# Patient Record
Sex: MALE | Race: WHITE | Employment: FULL TIME | URBAN - METROPOLITAN AREA
[De-identification: names, ages, dates, MRNs, and addresses within clinical notes are randomized per-mention and may not be internally consistent; named-entity substitution may affect disease eponyms.]

---

## 2017-02-10 ENCOUNTER — APPOINTMENT (OUTPATIENT)
Dept: CT IMAGING | Age: 42
End: 2017-02-10
Attending: EMERGENCY MEDICINE
Payer: COMMERCIAL

## 2017-02-10 ENCOUNTER — HOSPITAL ENCOUNTER (EMERGENCY)
Age: 42
Discharge: HOME | End: 2017-02-10
Attending: EMERGENCY MEDICINE
Payer: COMMERCIAL

## 2017-02-10 VITALS
DIASTOLIC BLOOD PRESSURE: 70 MMHG | HEART RATE: 84 BPM | HEIGHT: 66 IN | RESPIRATION RATE: 16 BRPM | TEMPERATURE: 98.8 F | OXYGEN SATURATION: 98 % | SYSTOLIC BLOOD PRESSURE: 144 MMHG | WEIGHT: 300 LBS | BODY MASS INDEX: 48.21 KG/M2

## 2017-02-10 DIAGNOSIS — K52.9 ENTEROCOLITIS: Primary | ICD-10-CM

## 2017-02-10 LAB
ALBUMIN SERPL BCP-MCNC: 3.6 G/DL (ref 3.5–5)
ALBUMIN/GLOB SERPL: 0.7 {RATIO} (ref 1.2–3.5)
ALP SERPL-CCNC: 95 U/L (ref 50–136)
ALT SERPL-CCNC: 41 U/L (ref 12–65)
ANION GAP BLD CALC-SCNC: 9 MMOL/L (ref 7–16)
AST SERPL W P-5'-P-CCNC: 69 U/L (ref 15–37)
BASOPHILS # BLD AUTO: 0 K/UL (ref 0–0.2)
BASOPHILS # BLD: 0 % (ref 0–2)
BILIRUB SERPL-MCNC: 0.4 MG/DL (ref 0.2–1.1)
BUN SERPL-MCNC: 19 MG/DL (ref 6–23)
CALCIUM SERPL-MCNC: 8.5 MG/DL (ref 8.3–10.4)
CHLORIDE SERPL-SCNC: 106 MMOL/L (ref 98–107)
CO2 SERPL-SCNC: 26 MMOL/L (ref 21–32)
CREAT SERPL-MCNC: 1.33 MG/DL (ref 0.8–1.5)
DIFFERENTIAL METHOD BLD: ABNORMAL
EOSINOPHIL # BLD: 0.1 K/UL (ref 0–0.8)
EOSINOPHIL NFR BLD: 1 % (ref 0.5–7.8)
ERYTHROCYTE [DISTWIDTH] IN BLOOD BY AUTOMATED COUNT: 13.7 % (ref 11.9–14.6)
GLOBULIN SER CALC-MCNC: 5 G/DL (ref 2.3–3.5)
GLUCOSE SERPL-MCNC: 114 MG/DL (ref 65–100)
HCT VFR BLD AUTO: 43.4 % (ref 41.1–50.3)
HGB BLD-MCNC: 14.5 G/DL (ref 13.6–17.2)
IMM GRANULOCYTES # BLD: 0 K/UL (ref 0–0.5)
IMM GRANULOCYTES NFR BLD AUTO: 0 % (ref 0–5)
LIPASE SERPL-CCNC: 171 U/L (ref 73–393)
LYMPHOCYTES # BLD AUTO: 14 % (ref 13–44)
LYMPHOCYTES # BLD: 1.7 K/UL (ref 0.5–4.6)
MCH RBC QN AUTO: 29.8 PG (ref 26.1–32.9)
MCHC RBC AUTO-ENTMCNC: 33.4 G/DL (ref 31.4–35)
MCV RBC AUTO: 89.1 FL (ref 79.6–97.8)
MONOCYTES # BLD: 1.1 K/UL (ref 0.1–1.3)
MONOCYTES NFR BLD AUTO: 9 % (ref 4–12)
NEUTS SEG # BLD: 9.2 K/UL (ref 1.7–8.2)
NEUTS SEG NFR BLD AUTO: 76 % (ref 43–78)
PLATELET # BLD AUTO: 312 K/UL (ref 150–450)
PLATELET COMMENTS,PCOM: ADEQUATE
PMV BLD AUTO: 10.6 FL (ref 10.8–14.1)
POTASSIUM SERPL-SCNC: 3.8 MMOL/L (ref 3.5–5.1)
PROT SERPL-MCNC: 8.6 G/DL (ref 6.3–8.2)
RBC # BLD AUTO: 4.87 M/UL (ref 4.23–5.67)
RBC MORPH BLD: ABNORMAL
SODIUM SERPL-SCNC: 141 MMOL/L (ref 136–145)
WBC # BLD AUTO: 12.1 K/UL (ref 4.3–11.1)
WBC MORPH BLD: ABNORMAL

## 2017-02-10 PROCEDURE — 83690 ASSAY OF LIPASE: CPT | Performed by: EMERGENCY MEDICINE

## 2017-02-10 PROCEDURE — 85025 COMPLETE CBC W/AUTO DIFF WBC: CPT | Performed by: EMERGENCY MEDICINE

## 2017-02-10 PROCEDURE — 74011636320 HC RX REV CODE- 636/320: Performed by: EMERGENCY MEDICINE

## 2017-02-10 PROCEDURE — 81003 URINALYSIS AUTO W/O SCOPE: CPT | Performed by: EMERGENCY MEDICINE

## 2017-02-10 PROCEDURE — 80053 COMPREHEN METABOLIC PANEL: CPT | Performed by: EMERGENCY MEDICINE

## 2017-02-10 PROCEDURE — 74177 CT ABD & PELVIS W/CONTRAST: CPT

## 2017-02-10 PROCEDURE — 74011000258 HC RX REV CODE- 258: Performed by: EMERGENCY MEDICINE

## 2017-02-10 PROCEDURE — 99284 EMERGENCY DEPT VISIT MOD MDM: CPT | Performed by: EMERGENCY MEDICINE

## 2017-02-10 RX ORDER — SODIUM CHLORIDE 0.9 % (FLUSH) 0.9 %
10 SYRINGE (ML) INJECTION
Status: COMPLETED | OUTPATIENT
Start: 2017-02-10 | End: 2017-02-10

## 2017-02-10 RX ORDER — CIPROFLOXACIN 500 MG/1
500 TABLET ORAL 2 TIMES DAILY
Qty: 14 TAB | Refills: 0 | Status: SHIPPED | OUTPATIENT
Start: 2017-02-10 | End: 2017-02-17

## 2017-02-10 RX ORDER — HYOSCYAMINE SULFATE 0.125 MG
125 TABLET ORAL
Qty: 20 TAB | Refills: 0 | Status: SHIPPED | OUTPATIENT
Start: 2017-02-10 | End: 2017-06-13

## 2017-02-10 RX ORDER — METRONIDAZOLE 500 MG/1
500 TABLET ORAL 2 TIMES DAILY
Qty: 14 TAB | Refills: 0 | Status: SHIPPED | OUTPATIENT
Start: 2017-02-10 | End: 2017-02-17

## 2017-02-10 RX ORDER — HYDROCODONE BITARTRATE AND ACETAMINOPHEN 5; 325 MG/1; MG/1
1 TABLET ORAL
Qty: 15 TAB | Refills: 0 | Status: SHIPPED | OUTPATIENT
Start: 2017-02-10 | End: 2017-06-13

## 2017-02-10 RX ADMIN — Medication 10 ML: at 20:42

## 2017-02-10 RX ADMIN — SODIUM CHLORIDE 100 ML: 900 INJECTION, SOLUTION INTRAVENOUS at 20:42

## 2017-02-10 RX ADMIN — IOVERSOL 100 ML: 741 INJECTION INTRA-ARTERIAL; INTRAVENOUS at 20:42

## 2017-02-10 NOTE — Clinical Note
Return to the ER initially for worsening pain, prolonged vomiting or continues fevers Take antibiotics as prescribed

## 2017-02-10 NOTE — ED TRIAGE NOTES
Patient arrives to the ER via POV. Patient was seen by pcp who sent him for STAT abd CT. Patient states he got scared and didn't go through with the scan because he felt better. After the patient got home he began to feel worse. Patient states pain in his umbilical area. Patient denies n/v/d. Patient states he feels like as if his stomach had the flu.

## 2017-02-11 NOTE — DISCHARGE INSTRUCTIONS
Colitis: Care Instructions  Your Care Instructions  Colitis is the medical term for swelling (inflammation) of the intestine. It can be caused by different things, such as an infection or loss of blood flow in the intestine. Other causes are problems like Crohn's disease or ulcerative colitis. Symptoms may include fever, diarrhea that may be bloody, or belly pain. Sometimes symptoms go away without treatment. But you may need treatment or more tests, such as blood tests or a stool test. Or you may need imaging tests like a CT scan or a colonoscopy. In some cases, the doctor may want to test a sample of tissue from the intestine. This test is called a biopsy. The doctor has checked you carefully, but problems can develop later. If you notice any problems or new symptoms, get medical treatment right away. Follow-up care is a key part of your treatment and safety. Be sure to make and go to all appointments, and call your doctor if you are having problems. It's also a good idea to know your test results and keep a list of the medicines you take. How can you care for yourself at home? · Rest until you feel better. · Your doctor may recommend that you eat bland foods. These include rice, dry toast or crackers, bananas, and applesauce. · To prevent dehydration, drink plenty of fluids. Choose water and other caffeine-free clear liquids until you feel better. If you have kidney, heart, or liver disease and have to limit fluids, talk with your doctor before you increase the amount of fluids you drink. · Be safe with medicines. Take your medicines exactly as prescribed. Call your doctor if you think you are having a problem with your medicine. You will get more details on the specific medicines your doctor prescribes. When should you call for help? Call 911 anytime you think you may need emergency care. For example, call if:  · You passed out (lost consciousness).   · You vomit blood or what looks like coffee grounds. · Your stools are maroon or very bloody. Call your doctor now or seek immediate medical care if:  · You have new or worse pain. · You have a new or higher fever. · You have new or worse symptoms. · You cannot keep fluids or medicines down. Watch closely for changes in your health, and be sure to contact your doctor if:  · You do not get better as expected. Where can you learn more? Go to http://karlie-alonso.info/. Annabelle Kent in the search box to learn more about \"Colitis: Care Instructions. \"  Current as of: August 9, 2016  Content Version: 11.1  © 1087-2420 [employer]. Care instructions adapted under license by [employer] (which disclaims liability or warranty for this information). If you have questions about a medical condition or this instruction, always ask your healthcare professional. [address] any warranty or liability for your use of this information.

## 2017-02-11 NOTE — ED PROVIDER NOTES
HPI Comments: Patient presents to the ER complaining of abdominal pain and fever. Patient states symptoms have been present for the past 4 days. Describes abdominal pain as aching and crampy. Does report some intermittent diarrhea. Denies any melena, hematochezia, vomiting or hematemesis. Patient is a 35 y.o. male presenting with abdominal pain. The history is provided by the patient. Abdominal Pain    This is a new problem. The current episode started more than 2 days ago. The problem occurs constantly. The problem has not changed since onset. The pain is located in the generalized abdominal region. The quality of the pain is aching and cramping. The pain is at a severity of 5/10. The pain is moderate. Associated symptoms include a fever and diarrhea. Pertinent negatives include no hematochezia, no melena, no vomiting, no frequency and no back pain. Nothing worsens the pain. His past medical history does not include gallstones or Crohn's disease. Past Medical History:   Diagnosis Date    GERD (gastroesophageal reflux disease)     Hypertension        Past Surgical History:   Procedure Laterality Date    Hx hernia repair       right inguinal hernia         Family History:   Problem Relation Age of Onset    Diabetes Father     Hypertension Father    [place] [employer] Elevated Lipids Father        Social History     Social History    Marital status:      Spouse name: N/A    Number of children: N/A    Years of education: N/A     Occupational History    Not on file. Social History Main Topics    Smoking status: Never Smoker    Smokeless tobacco: Never Used    Alcohol use 0.5 oz/week     1 Cans of beer per week      Comment: occasionally    Drug use: No    Sexual activity: Not on file      Comment: not asked     Other Topics Concern    Not on file     Social History Narrative         ALLERGIES: Review of patient's allergies indicates no known allergies.     Review of Systems   Constitutional: Positive for fever. Negative for diaphoresis. HENT: Negative for congestion, dental problem, trouble swallowing and voice change. Eyes: Negative for photophobia and visual disturbance. Respiratory: Negative for chest tightness, shortness of breath and stridor. Cardiovascular: Negative for palpitations and leg swelling. Gastrointestinal: Positive for abdominal pain and diarrhea. Negative for hematochezia, melena and vomiting. Endocrine: Negative for polydipsia, polyphagia and polyuria. Genitourinary: Negative for flank pain, frequency and urgency. Musculoskeletal: Negative for back pain and gait problem. Skin: Negative for pallor and rash. Allergic/Immunologic: Negative for food allergies and immunocompromised state. Neurological: Negative for light-headedness and numbness. Hematological: Negative for adenopathy. Does not bruise/bleed easily. Psychiatric/Behavioral: Negative for behavioral problems and confusion. All other systems reviewed and are negative. Vitals:    02/10/17 1850   BP: (!) 186/95   Pulse: 86   Resp: 26   Temp: 99 °F (37.2 °C)   SpO2: 98%   Weight: 136.1 kg (300 lb)   Height: 5' 6\" (1.676 m)            Physical Exam   Constitutional: He is oriented to person, place, and time. He appears well-developed and well-nourished. HENT:   Head: Normocephalic and atraumatic. Mouth/Throat: Oropharynx is clear and moist.   Eyes: Conjunctivae and EOM are normal. Pupils are equal, round, and reactive to light. No scleral icterus. Neck: Normal range of motion. Neck supple. No tracheal deviation present. No thyromegaly present. Cardiovascular: Normal rate, regular rhythm and normal heart sounds. No murmur heard. Pulmonary/Chest: Effort normal and breath sounds normal. No respiratory distress. Abdominal: Soft. Bowel sounds are normal. He exhibits no distension. There is no tenderness. Musculoskeletal: Normal range of motion.  He exhibits no edema, tenderness or deformity. Neurological: He is alert and oriented to person, place, and time. He has normal reflexes. No cranial nerve deficit. Skin: Skin is warm and dry. No rash noted. No erythema. Nursing note and vitals reviewed. MDM  Number of Diagnoses or Management Options  Diagnosis management comments: DDx: Colitis, diverticulitis, gastroenteritis    Will obtain Labs and CT    9:13 PM     CT scan does show some mild inflammation around terminal ileum and cecum, suggestive of enterocolitis. Appendix listed as unremarkable. Will place patient on antibiotics for colitis       Amount and/or Complexity of Data Reviewed  Clinical lab tests: ordered and reviewed  Tests in the radiology section of CPT®: ordered and reviewed    Risk of Complications, Morbidity, and/or Mortality  Presenting problems: moderate  Diagnostic procedures: low  Management options: moderate    Patient Progress  Patient progress: stable    ED Course       Procedures      Results Include:    Recent Results (from the past 24 hour(s))   CBC, POC    Collection Time: 02/10/17 11:37 AM   Result Value Ref Range    WBC 12.4 (H) 4.0 - 11.0 K/uL    RBC 4.93 4.40 - 6.20 M/uL    HGB 14.0 13.5 - 18.0 g/dL    HCT 43.5 41.0 - 54.0 %    MCV 88 80 - 100 fL    MCH 28.4 27.0 - 34.0 pg    MCHC 32.2 31.0 - 36.0 g/dL    PLATELET 413 511 - 615 K/uL    LYMPHOCYTES 14.9 (L) 20.5 - 51.1 %    MONOCYTES 3.8 3.0 - 10.0 %    GRANULOCYTES 81.3 37.0 - 92.0 %    ABS. LYMPHOCYTES 1.8 0.7 - 3.1 K/uL    ABS. MONOCYTES 0.4 0.1 - 1.1 K/uL    ABS.  GRANULOCYTES 10.2 (H) 2.0 - 7.8 K/uL    RDW 14.8 11.0 - 16.0 %    MEAN PLATELET VOLUME 6.9 6.9 - 10.4 fL   CBC WITH AUTOMATED DIFF    Collection Time: 02/10/17  6:49 PM   Result Value Ref Range    WBC 12.1 (H) 4.3 - 11.1 K/uL    RBC 4.87 4.23 - 5.67 M/uL    HGB 14.5 13.6 - 17.2 g/dL    HCT 43.4 41.1 - 50.3 %    MCV 89.1 79.6 - 97.8 FL    MCH 29.8 26.1 - 32.9 PG    MCHC 33.4 31.4 - 35.0 g/dL    RDW 13.7 11.9 - 14.6 %    PLATELET 676 565 - 450 K/uL    MPV 10.6 (L) 10.8 - 14.1 FL    DF PENDING    METABOLIC PANEL, COMPREHENSIVE    Collection Time: 02/10/17  6:49 PM   Result Value Ref Range    Sodium 141 136 - 145 mmol/L    Potassium 3.8 3.5 - 5.1 mmol/L    Chloride 106 98 - 107 mmol/L    CO2 26 21 - 32 mmol/L    Anion gap 9 7 - 16 mmol/L    Glucose 114 (H) 65 - 100 mg/dL    BUN 19 6 - 23 MG/DL    Creatinine 1.33 0.8 - 1.5 MG/DL    GFR est AA >60 >60 ml/min/1.73m2    GFR est non-AA >60 >60 ml/min/1.73m2    Calcium 8.5 8.3 - 10.4 MG/DL    Bilirubin, total 0.4 0.2 - 1.1 MG/DL    ALT (SGPT) 41 12 - 65 U/L    AST (SGOT) 69 (H) 15 - 37 U/L    Alk.  phosphatase 95 50 - 136 U/L    Protein, total 8.6 (H) 6.3 - 8.2 g/dL    Albumin 3.6 3.5 - 5.0 g/dL    Globulin 5.0 (H) 2.3 - 3.5 g/dL    A-G Ratio 0.7 (L) 1.2 - 3.5     LIPASE    Collection Time: 02/10/17  6:49 PM   Result Value Ref Range    Lipase 171 73 - 393 U/L

## 2017-05-01 ENCOUNTER — HOSPITAL ENCOUNTER (OUTPATIENT)
Dept: PHYSICAL THERAPY | Age: 42
Discharge: HOME | End: 2017-05-01
Attending: FAMILY MEDICINE
Payer: COMMERCIAL

## 2017-05-01 DIAGNOSIS — M54.2 NECK PAIN: ICD-10-CM

## 2017-05-01 PROCEDURE — 97110 THERAPEUTIC EXERCISES: CPT

## 2017-05-01 PROCEDURE — 97161 PT EVAL LOW COMPLEX 20 MIN: CPT

## 2017-05-01 NOTE — PROGRESS NOTES
Ameena Desai  : [facility] [address]  [phone]   [phone]       OUTPATIENT PHYSICAL THERAPY:Initial Assessment and Daily Note 5/3/2017    ICD-10: Treatment Diagnosis: Cervicalgia (M54.2)   Precautions/Allergies:   Review of patient's allergies indicates no known allergies. Fall Risk Score: 1 (? 5 = High Risk)  MD Orders: Eval and Treat MEDICAL/REFERRING DIAGNOSIS:  Neck pain [M54.2]   DATE OF ONSET: six months  REFERRING PHYSICIAN: Yarelis Corea MD  RETURN PHYSICIAN APPOINTMENT: TBD by patient      INITIAL ASSESSMENT:  Mr. Ameena Desai has attended 1 physical therapy session including initial evaluation. Ameena Desai presents with S/S of increased pain, decreased ROM, decreased strength in the right C6 myotomes, decreased functional tolerance consistent with S/S of cervical radicular symtomology. Ameena Desai will benefit from home exercise program, therapeutic and postural strengthening exercises, manual therapeutic techniques (ie. Distraction, SOR, myofascial release/soft tissue mobilization) as appropriate to address Clint Cardona's current condition. Ameena Desai will benefit from skilled PT (medically necessary) to address above deficits affecting participation in basic ADLs and overall functional tolerance. PROBLEM LIST (Impacting functional limitations):  1. Decreased Strength  2. Decreased ADL/Functional Activities  3. Increased Pain  4. Decreased Activity Tolerance  5. Decreased Work Simplification/Energy Conservation Techniques  6. Decreased Flexibility/Joint Mobility  7. Decreased [place] with Home Exercise Program INTERVENTIONS PLANNED:  1. Balance Exercise  2. Bed Mobility  3. Cold  4. Cryotherapy  5. Heat  6. Home Exercise Program (HEP)  7. Manual Therapy  8. Neuromuscular Re-education/Strengthening  9.  Range of Motion (ROM)  10. Therapeutic Exercise/Strengthening  11. Transfer Training   TREATMENT PLAN:  Effective Dates: 5/1/2017 TO 8/1/2017. Frequency/Duration: 2 times a week for 6 weeks   GOALS: (Goals have been discussed and agreed upon with patient.)  SHORT-TERM FUNCTIONAL GOALS: Time Frame: 3 weeks  1. Antoinette Moore will report <=4/10 pain to cervical spine with performance of functional spinal mobility and rotation. 2.  Antoinette Moore will demonstrate improved NDI score, indicating spinal improvement from 15/50 to 8/50 affecting minimal to no difficulty with performance of cervical mobility and strengthening. [address] to be independent with initial HEP for cervical region and UE's. [address] will improve ROM to >=90% to assist with improved function during instrumental activities of daily living. [address] will improve MMT to >=4+/5 to all UE strengthening to return to PLOF and improve functional tolerance. DISCHARGE GOALS: Time Frame: 6 weeks  1. Antoinette Moore will report <=2/10 pain to cervical spine with performance of functional spinal mobility and rotation and minimal to no difficulty with such tasks. 2. Antoinette Moore will demonstrate improved NDI score, indicating spinal improvement from 8/50 to 4/50 affecting minimal to no difficulty with performance of cervical mobility and strengthening. 3. Antoinette Moore to be independent with advanced HEP for cervical region and UE's. Rehabilitation Potential For Stated Goals: Good  Regarding [facility]rendan's therapy, I certify that the treatment plan above will be carried out by a therapist or under their direction.   Thank you for this referral,  Carmen Aldana PT     Referring Physician Signature: Nohemy Demarco MD              Date                    HISTORY:   History of Present Injury/Illness (Reason for Referral):  Mr. Virgel Sacks reports onset of right sided neck pain and arm numbness that began in the summer and has persisted. He reports that his symptoms are exacerbated by swinging a golf club, sitting with poor posture, turning looking to the right when driving, sleep when laying on his right side, and reading. He reports that his symptoms make driving during work duties difficult. His goals are to improved his pain, ROM, and be safe with sleep, home, and work duties. Past Medical History/Comorbidities:   Mr. Daniel Nguyễn  has a past medical history of GERD (gastroesophageal reflux disease) and Hypertension. Mr. Daniel Nguyễn  has a past surgical history that includes hernia repair. Social History/Living Environment:     lives with family in private residence  Prior Level of Function/Work/Activity:  Works for a [employer]  Previous Treatment Approaches:          Previously has had similar symptoms that resolved on their own a few years ago. Current Medications:    Current Outpatient Prescriptions:     naproxen (NAPROSYN) 500 mg tablet, Take 1 Tab by mouth two (2) times daily (with meals). Indications: Pain, Disp: 60 Tab, Rfl: 1    FLUARIX QUAD 6812-1361, PF, syrg injection, TO BE ADMINISTERED BY PHARMACIST FOR IMMUNIZATION, Disp: , Rfl: 0    hyoscyamine (LEVSIN) 0.125 mg tablet, Take 1 Tab by mouth every six (6) hours as needed for Cramping or Diarrhea., Disp: 20 Tab, Rfl: 0    HYDROcodone-acetaminophen (NORCO) 5-325 mg per tablet, Take 1 Tab by mouth every four (4) hours as needed for Pain. Max Daily Amount: 6 Tabs., Disp: 15 Tab, Rfl: 0    omeprazole (PRILOSEC) 40 mg capsule, Take 1 Cap by mouth daily. Indications: GASTROESOPHAGEAL REFLUX, Disp: 90 Cap, Rfl: 12    amLODIPine (NORVASC) 5 mg tablet, Take 1 Tab by mouth daily.  Indications: Hypertension, Disp: 90 Tab, Rfl: 12     Date Last Reviewed:  5/1/2017     Number of Personal Factors/Comorbidities that affect the Plan of Care: 1-2: MODERATE COMPLEXITY   EXAMINATION: Observation/Orthostatic Postural Assessment: Forward head posture with upper crossed syndrome present. Palpation:          Tenderness over the right cervical superficial extensors and upper trap. Tenderness to palpation at righ CT junction and upper thoracic segments with P/A glides  ROM:    Joint: Eval Date: 5/1/2017 Re-Assess Date: Re-Assess Date:         Cervical AROM      Flexion (% of normal): 100%     Extension (% of normal): 50%     L sidebending (% of normal): 75%     R sidebending (% of normal): 50% with pain     L rotation (% of normal): 75%      R rotation (% of normal): 50% with pain                 Pain at end-range or with AROM? Yes/No Yes     Any pain with overpressure? Yes/No Yes       Strength:    Joint: Eval Date:  Re-Assess Date:  Re-Assess Date:     RIGHT LEFT RIGHT LEFT RIGHT LEFT   Shoulder flexion:  5/5 5/5       Shoulder extension: 5/5 5/5       Shoulder abduction: 5/5 5/5       Shoulder IR: 5/5 5/5       Shoulder ER: 4+/5 5/5       Elbow flexion: 4+/5 5/5       Elbow extension: 5/5 5/5       Wrist flexion/extension: 4+/5 5/5           Special Tests: Assessed @ Initial Visit    Spurling's Test: Positive on right   Positive cranial flexion test with inability to stabilize at 22  Neurological Screen: Radiating symptoms? Yes     Functional Mobility:  Assessed @ Initial Visit   Balance:  Assessed @ Initial Visit      Body Structures Involved:  1. Nerves  2. Joints  3. Muscles  4. Ligaments Body Functions Affected:  1. Sensory/Pain  2. Neuromusculoskeletal  3. Movement Related Activities and Participation Affected:  1. General Tasks and Demands  2. Mobility  3. Self Care  4. Interpersonal Interactions and Relationships   Number of elements that affect the Plan of Care: 3: MODERATE COMPLEXITY   CLINICAL PRESENTATION:   Presentation: Stable and uncomplicated: LOW COMPLEXITY   CLINICAL DECISION MAKING:   Outcome Measure:    Tool Used: Neck Disability Index (NDI)  Score:  Initial: 15/50 Most Recent: X/50 (Date: -- )   Interpretation of Score: The Neck Disability Index is a revised form of the Oswestry Low Back Pain Index and is designed to measure the activities of daily living in person's with neck pain. Each section is scored on a 0-5 scale, 5 representing the greatest disability. The scores of each section are added together for a total score of 50. Score 0 1-10 11-20 21-30 31-40 41-49 50   Modifier CH CI CJ CK CL CM CN       Medical Necessity:   · Skilled intervention continues to be required due to address above deficits affecting participation in basic ADLs and overall functional tolerance. Reason for Services/Other Comments:  · Patient continues to require skilled intervention due to address above deficits affecting participation in basic ADLs and overall functional tolerance. Use of outcome tool(s) and clinical judgement create a POC that gives a: Clear prediction of patient's progress: LOW COMPLEXITY   TREATMENT:   (In addition to Assessment/Re-Assessment sessions the following treatments were rendered)  THERAPEUTIC EXERCISE: (20 minutes):  Exercises per grid below to improve mobility, strength, balance and coordination. Required minimal visual and verbal cues to promote proper body alignment and promote proper body posture. Progressed resistance, range, repetitions and complexity of movement as indicated. Date:  5/1/2107 Date:   Date:     Activity/Exercise Parameters Parameters Parameters   Cranial flexion with cuff feed back x10'     Sidelying trunk rotation x5'     Seated chin tuck x5'                                   MANUAL THERAPY: (0 minutes): Joint mobilization, Soft tissue mobilization and Manipulation was utilized and necessary because of the patient's restricted joint motion, painful spasm, loss of articular motion and restricted motion of soft tissue.    MODALITIES: (0 minutes):    (Used abbreviations: MET - muscle energy technique; PNF - proprioceptive neuromuscular facilitation; NMR - neuromuscular re-education; AP - anterior to posterior; PA - posterior to anterior)    Pre-Treatment Assessment: See patient history  Treatment/Session Assessment: Patient had improved ROM. · Pain/ Symptoms: Initial:   5/10 Post Session:  4/10 ·   Compliance with Program/Exercises: Will assess as treatment progresses. · Recommendations/Intent for next treatment session: \"Next visit will focus on advancements to more challenging activities and reduction in assistance provided\".   Total Treatment Duration:  PT Patient Time In/Time Out  Time In: 1300  Time Out: [address] [facility],

## 2017-05-01 NOTE — PROGRESS NOTES
Ambulatory/Rehab Services H2 Model Falls Risk Assessment    Risk Factor Pts. ·   Confusion/Disorientation/Impulsivity  []    4 ·   Symptomatic Depression  []   2 ·   Altered Elimination  []   1 ·   Dizziness/Vertigo  []   1 ·   Gender (Male)  [x]   1 ·   Any administered antiepileptics (anticonvulsants):  []   2 ·   Any administered benzodiazepines:  []   1 ·   Visual Impairment (specify):  []   1 ·   Portable Oxygen Use  []   1 ·   Orthostatic ? BP  []   1 ·   History of Recent Falls (within 3 mos.)  []   5     Ability to Rise from Chair (choose one) Pts. ·   Ability to rise in a single movement  [x]   0 ·   Pushes up, successful in one attempt  []   1 ·   Multiple attempts, but successful  []   3 ·   Unable to rise without assistance  []   4   Total: (5 or greater = High Risk) 1     Falls Prevention Plan:   []                Physical Limitations to Exercise (specify):   []                Mobility Assistance Device (type):   []                Exercise/Equipment Adaptation (specify):    ©2010 [facility] of [address] Patent #[id number].  Federal Law prohibits the replication, distribution or use without written permission from [facility] [employer]

## 2017-05-04 ENCOUNTER — HOSPITAL ENCOUNTER (OUTPATIENT)
Dept: PHYSICAL THERAPY | Age: 42
Discharge: HOME | End: 2017-05-04
Attending: FAMILY MEDICINE
Payer: COMMERCIAL

## 2017-05-04 NOTE — PROGRESS NOTES
America Cortez  : 1975 [facility] [address]  [phone]   [phone]       OUTPATIENT PHYSICAL THERAPY: Daily Note 2017    ICD-10: Treatment Diagnosis: Cervicalgia (M54.2)   Precautions/Allergies:   Review of patient's allergies indicates no known allergies. Fall Risk Score: 1 (? 5 = High Risk)  MD Orders: Eval and Treat MEDICAL/REFERRING DIAGNOSIS:  Neck pain [M54.2]   DATE OF ONSET: six months  REFERRING PHYSICIAN: Miguelina Bueno MD  RETURN PHYSICIAN APPOINTMENT: TBD by patient      INITIAL ASSESSMENT:  Mr. America Cortez has attended 1 physical therapy session including initial evaluation. America Cortez presents with S/S of increased pain, decreased ROM, decreased strength in the right C6 myotomes, decreased functional tolerance consistent with S/S of cervical radicular symtomology. America Cortez will benefit from home exercise program, therapeutic and postural strengthening exercises, manual therapeutic techniques (ie. Distraction, SOR, myofascial release/soft tissue mobilization) as appropriate to address Larry Cardona's current condition. America Cortez will benefit from skilled PT (medically necessary) to address above deficits affecting participation in basic ADLs and overall functional tolerance. PROBLEM LIST (Impacting functional limitations):  1. Decreased Strength  2. Decreased ADL/Functional Activities  3. Increased Pain  4. Decreased Activity Tolerance  5. Decreased Work Simplification/Energy Conservation Techniques  6. Decreased Flexibility/Joint Mobility  7. Decreased [place] with Home Exercise Program INTERVENTIONS PLANNED:  1. Balance Exercise  2. Bed Mobility  3. Cold  4. Cryotherapy  5. Heat  6. Home Exercise Program (HEP)  7. Manual Therapy  8. Neuromuscular Re-education/Strengthening  9. Range of Motion (ROM)  10.  Therapeutic Exercise/Strengthening  11. Transfer Training   TREATMENT PLAN:  Effective Dates: 5/1/2017 TO 8/1/2017. Frequency/Duration: 2 times a week for 6 weeks   GOALS: (Goals have been discussed and agreed upon with patient.)  SHORT-TERM FUNCTIONAL GOALS: Time Frame: 3 weeks  1. Félix Hodges will report <=4/10 pain to cervical spine with performance of functional spinal mobility and rotation. 2.  Félix Hodges will demonstrate improved NDI score, indicating spinal improvement from 15/50 to 8/50 affecting minimal to no difficulty with performance of cervical mobility and strengthening. [address] to be independent with initial HEP for cervical region and UE's. [address] will improve ROM to >=90% to assist with improved function during instrumental activities of daily living. [address] will improve MMT to >=4+/5 to all UE strengthening to return to PLOF and improve functional tolerance. DISCHARGE GOALS: Time Frame: 6 weeks  1. Félix Hodges will report <=2/10 pain to cervical spine with performance of functional spinal mobility and rotation and minimal to no difficulty with such tasks. 2. Félix Hodges will demonstrate improved NDI score, indicating spinal improvement from 8/50 to 4/50 affecting minimal to no difficulty with performance of cervical mobility and strengthening. 3. Félix Hodges to be independent with advanced HEP for cervical region and UE's. Rehabilitation Potential For Stated Goals: Good  Regarding Tsang Ankit Brendan's therapy, I certify that the treatment plan above will be carried out by a therapist or under their direction.   Thank you for this referral,  Grisel Gould PT     Referring Physician Signature: Antonio Cruz MD              Date                    HISTORY:   History of Present Injury/Illness (Reason for Referral):  Mr. Ruddy Lambert reports onset of right sided neck pain and arm numbness that began in the summer and has persisted. He reports that his symptoms are exacerbated by swinging a golf club, sitting with poor posture, turning looking to the right when driving, sleep when laying on his right side, and reading. He reports that his symptoms make driving during work duties difficult. His goals are to improved his pain, ROM, and be safe with sleep, home, and work duties. Past Medical History/Comorbidities:   Mr. Tao Mckinley  has a past medical history of GERD (gastroesophageal reflux disease) and Hypertension. Mr. Tao Mckinley  has a past surgical history that includes hernia repair. Social History/Living Environment:     lives with family in private residence  Prior Level of Function/Work/Activity:  Works for a [employer]  Previous Treatment Approaches:          Previously has had similar symptoms that resolved on their own a few years ago. Current Medications:    Current Outpatient Prescriptions:     naproxen (NAPROSYN) 500 mg tablet, Take 1 Tab by mouth two (2) times daily (with meals). Indications: Pain, Disp: 60 Tab, Rfl: 1    FLUARIX QUAD [id number], PF, syrg injection, TO BE ADMINISTERED BY PHARMACIST FOR IMMUNIZATION, Disp: , Rfl: 0    hyoscyamine (LEVSIN) 0.125 mg tablet, Take 1 Tab by mouth every six (6) hours as needed for Cramping or Diarrhea., Disp: 20 Tab, Rfl: 0    HYDROcodone-acetaminophen (NORCO) 5-325 mg per tablet, Take 1 Tab by mouth every four (4) hours as needed for Pain. Max Daily Amount: 6 Tabs., Disp: 15 Tab, Rfl: 0    omeprazole (PRILOSEC) 40 mg capsule, Take 1 Cap by mouth daily. Indications: GASTROESOPHAGEAL REFLUX, Disp: 90 Cap, Rfl: 12    amLODIPine (NORVASC) 5 mg tablet, Take 1 Tab by mouth daily.  Indications: Hypertension, Disp: 90 Tab, Rfl: 12     Date Last Reviewed:  5/4/2017     Number of Personal Factors/Comorbidities that affect the Plan of Care: 1-2: MODERATE COMPLEXITY   EXAMINATION:   Observation/Orthostatic Postural Assessment: Forward head posture with upper crossed syndrome present. Palpation:          Tenderness over the right cervical superficial extensors and upper trap. Tenderness to palpation at righ CT junction and upper thoracic segments with P/A glides  ROM:    Joint: Eval Date: 5/1/2017 Re-Assess Date: Re-Assess Date:         Cervical AROM      Flexion (% of normal): 100%     Extension (% of normal): 50%     L sidebending (% of normal): 75%     R sidebending (% of normal): 50% with pain     L rotation (% of normal): 75%      R rotation (% of normal): 50% with pain                 Pain at end-range or with AROM? Yes/No Yes     Any pain with overpressure? Yes/No Yes       Strength:    Joint: Eval Date:  Re-Assess Date:  Re-Assess Date:     RIGHT LEFT RIGHT LEFT RIGHT LEFT   Shoulder flexion:  5/5 5/5       Shoulder extension: 5/5 5/5       Shoulder abduction: 5/5 5/5       Shoulder IR: 5/5 5/5       Shoulder ER: 4+/5 5/5       Elbow flexion: 4+/5 5/5       Elbow extension: 5/5 5/5       Wrist flexion/extension: 4+/5 5/5           Special Tests: Assessed @ Initial Visit    Spurling's Test: Positive on right   Positive cranial flexion test with inability to stabilize at 22  Neurological Screen: Radiating symptoms? Yes     Functional Mobility:  Assessed @ Initial Visit   Balance:  Assessed @ Initial Visit      Body Structures Involved:  1. Nerves  2. Joints  3. Muscles  4. Ligaments Body Functions Affected:  1. Sensory/Pain  2. Neuromusculoskeletal  3. Movement Related Activities and Participation Affected:  1. General Tasks and Demands  2. Mobility  3. Self Care  4. Interpersonal Interactions and Relationships   Number of elements that affect the Plan of Care: 3: MODERATE COMPLEXITY   CLINICAL PRESENTATION:   Presentation: Stable and uncomplicated: LOW COMPLEXITY   CLINICAL DECISION MAKING:   Outcome Measure:    Tool Used: Neck Disability Index (NDI)  Score:  Initial: 15/50  Most Recent: X/50 (Date: -- ) Interpretation of Score: The Neck Disability Index is a revised form of the Oswestry Low Back Pain Index and is designed to measure the activities of daily living in person's with neck pain. Each section is scored on a 0-5 scale, 5 representing the greatest disability. The scores of each section are added together for a total score of 50. Score 0 1-10 11-20 21-30 31-40 41-49 50   Modifier CH CI CJ CK CL CM CN       Medical Necessity:   · Skilled intervention continues to be required due to address above deficits affecting participation in basic ADLs and overall functional tolerance. Reason for Services/Other Comments:  · Patient continues to require skilled intervention due to address above deficits affecting participation in basic ADLs and overall functional tolerance. Use of outcome tool(s) and clinical judgement create a POC that gives a: Clear prediction of patient's progress: LOW COMPLEXITY   TREATMENT:   (In addition to Assessment/Re-Assessment sessions the following treatments were rendered)  THERAPEUTIC EXERCISE: (25 minutes):  Exercises per grid below to improve mobility, strength, balance and coordination. Required minimal visual and verbal cues to promote proper body alignment and promote proper body posture. Progressed resistance, range, repetitions and complexity of movement as indicated. Date:  5/1/2107 Date:  5/4/2017 Date:     Activity/Exercise Parameters Parameters Parameters   Cranial flexion with cuff feed back x10'     Sidelying trunk rotation x5'     Seated chin tuck x5'     UBE   3'/3'    TB Rows  btb 2x20    TB B ER  rtb 2x20    Joint position error training  With visual laser feedback and multi-angle control          MANUAL THERAPY: (0 minutes): Joint mobilization, Soft tissue mobilization and Manipulation was utilized and necessary because of the patient's restricted joint motion, painful spasm, loss of articular motion and restricted motion of soft tissue.    MODALITIES: (15 minutes): mechanical traction in supine hooklyinpounds max tension, 8 pounds least tension x 15'. Patient reported improved tension and pain free ROM after treatment. (Used abbreviations: MET - muscle energy technique; PNF - proprioceptive neuromuscular facilitation; NMR - neuromuscular re-education; AP - anterior to posterior; PA - posterior to anterior)    Pre-Treatment Assessment: Patient reports improved pain with focus on posture. Treatment/Session Assessment: Patient had improved ROM and Posture with cueing for improved lower scapular positioning. · Pain/ Symptoms: Initial:   4/10 Post Session:  2/10 ·   Compliance with Program/Exercises: Will assess as treatment progresses. · Recommendations/Intent for next treatment session: \"Next visit will focus on advancements to more challenging activities and reduction in assistance provided\".   Total Treatment Duration:  PT Patient Time In/Time Out  Time In: 1215  Time Out: Bruce Heredia PT

## 2017-05-08 ENCOUNTER — HOSPITAL ENCOUNTER (OUTPATIENT)
Dept: PHYSICAL THERAPY | Age: 42
Discharge: HOME | End: 2017-05-08
Attending: FAMILY MEDICINE
Payer: COMMERCIAL

## 2017-05-08 PROCEDURE — 97012 MECHANICAL TRACTION THERAPY: CPT

## 2017-05-08 PROCEDURE — 97110 THERAPEUTIC EXERCISES: CPT

## 2017-05-08 PROCEDURE — 97140 MANUAL THERAPY 1/> REGIONS: CPT

## 2017-05-08 NOTE — PROGRESS NOTES
Gem Montgomery  : 1975 [facility] [address]  [phone]   [phone]       OUTPATIENT PHYSICAL THERAPY: Daily Note 2017    ICD-10: Treatment Diagnosis: Cervicalgia (M54.2)   Precautions/Allergies:   Review of patient's allergies indicates no known allergies. Fall Risk Score: 1 (? 5 = High Risk)  MD Orders: Eval and Treat MEDICAL/REFERRING DIAGNOSIS:  Neck pain [M54.2]   DATE OF ONSET: six months  REFERRING PHYSICIAN: Maninder Dixon MD  RETURN PHYSICIAN APPOINTMENT: TBD by patient      INITIAL ASSESSMENT:  Mr. Gem Montgomery has attended 1 physical therapy session including initial evaluation. Gem Montgomery presents with S/S of increased pain, decreased ROM, decreased strength in the right C6 myotomes, decreased functional tolerance consistent with S/S of cervical radicular symtomology. Gem Montgomery will benefit from home exercise program, therapeutic and postural strengthening exercises, manual therapeutic techniques (ie. Distraction, SOR, myofascial release/soft tissue mobilization) as appropriate to address Jw Cardona's current condition. Gem Montgomery will benefit from skilled PT (medically necessary) to address above deficits affecting participation in basic ADLs and overall functional tolerance. PROBLEM LIST (Impacting functional limitations):  1. Decreased Strength  2. Decreased ADL/Functional Activities  3. Increased Pain  4. Decreased Activity Tolerance  5. Decreased Work Simplification/Energy Conservation Techniques  6. Decreased Flexibility/Joint Mobility  7. Decreased [place] with Home Exercise Program INTERVENTIONS PLANNED:  1. Balance Exercise  2. Bed Mobility  3. Cold  4. Cryotherapy  5. Heat  6. Home Exercise Program (HEP)  7. Manual Therapy  8. Neuromuscular Re-education/Strengthening  9. Range of Motion (ROM)  10.  Therapeutic Exercise/Strengthening  11. Transfer Training   TREATMENT PLAN:  Effective Dates: 5/1/2017 TO 8/1/2017. Frequency/Duration: 2 times a week for 6 weeks   GOALS: (Goals have been discussed and agreed upon with patient.)  SHORT-TERM FUNCTIONAL GOALS: Time Frame: 3 weeks  1. Leni Yuniel will report <=4/10 pain to cervical spine with performance of functional spinal mobility and rotation. 2.  Leni Yuniel will demonstrate improved NDI score, indicating spinal improvement from 15/50 to 8/50 affecting minimal to no difficulty with performance of cervical mobility and strengthening. [address] to be independent with initial HEP for cervical region and UE's. [address] will improve ROM to >=90% to assist with improved function during instrumental activities of daily living. [address] will improve MMT to >=4+/5 to all UE strengthening to return to PLOF and improve functional tolerance. DISCHARGE GOALS: Time Frame: 6 weeks  1. Leni Yuniel will report <=2/10 pain to cervical spine with performance of functional spinal mobility and rotation and minimal to no difficulty with such tasks. 2. Leni Yuniel will demonstrate improved NDI score, indicating spinal improvement from 8/50 to 4/50 affecting minimal to no difficulty with performance of cervical mobility and strengthening. 3. Leni Yuniel to be independent with advanced HEP for cervical region and UE's. Rehabilitation Potential For Stated Goals: Good  Regarding Bertha Zapien Brendan's therapy, I certify that the treatment plan above will be carried out by a therapist or under their direction.   Thank you for this referral,  Kirill Maxwell PT     Referring Physician Signature: Juan Manuel Weber MD              Date                    HISTORY:   History of Present Injury/Illness (Reason for Referral):  Mr. Gurwinder Chow reports onset of right sided neck pain and arm numbness that began in the summer and has persisted. He reports that his symptoms are exacerbated by swinging a golf club, sitting with poor posture, turning looking to the right when driving, sleep when laying on his right side, and reading. He reports that his symptoms make driving during work duties difficult. His goals are to improved his pain, ROM, and be safe with sleep, home, and work duties. Past Medical History/Comorbidities:   Mr. Alcira Madrigal  has a past medical history of GERD (gastroesophageal reflux disease) and Hypertension. Mr. Alcira Madrigal  has a past surgical history that includes hernia repair. Social History/Living Environment:     lives with family in private residence  Prior Level of Function/Work/Activity:  Works for a [employer]  Previous Treatment Approaches:          Previously has had similar symptoms that resolved on their own a few years ago. Current Medications:    Current Outpatient Prescriptions:     naproxen (NAPROSYN) 500 mg tablet, Take 1 Tab by mouth two (2) times daily (with meals). Indications: Pain, Disp: 60 Tab, Rfl: 1    FLUARIX QUAD [id number], PF, syrg injection, TO BE ADMINISTERED BY PHARMACIST FOR IMMUNIZATION, Disp: , Rfl: 0    hyoscyamine (LEVSIN) 0.125 mg tablet, Take 1 Tab by mouth every six (6) hours as needed for Cramping or Diarrhea., Disp: 20 Tab, Rfl: 0    HYDROcodone-acetaminophen (NORCO) 5-325 mg per tablet, Take 1 Tab by mouth every four (4) hours as needed for Pain. Max Daily Amount: 6 Tabs., Disp: 15 Tab, Rfl: 0    omeprazole (PRILOSEC) 40 mg capsule, Take 1 Cap by mouth daily. Indications: GASTROESOPHAGEAL REFLUX, Disp: 90 Cap, Rfl: 12    amLODIPine (NORVASC) 5 mg tablet, Take 1 Tab by mouth daily.  Indications: Hypertension, Disp: 90 Tab, Rfl: 12     Date Last Reviewed:  5/8/2017     Number of Personal Factors/Comorbidities that affect the Plan of Care: 1-2: MODERATE COMPLEXITY   EXAMINATION:   Observation/Orthostatic Postural Assessment: Forward head posture with upper crossed syndrome present. Palpation:          Tenderness over the right cervical superficial extensors and upper trap. Tenderness to palpation at righ CT junction and upper thoracic segments with P/A glides  ROM:    Joint: Eval Date: 5/1/2017 Re-Assess Date: Re-Assess Date:         Cervical AROM      Flexion (% of normal): 100%     Extension (% of normal): 50%     L sidebending (% of normal): 75%     R sidebending (% of normal): 50% with pain     L rotation (% of normal): 75%      R rotation (% of normal): 50% with pain                 Pain at end-range or with AROM? Yes/No Yes     Any pain with overpressure? Yes/No Yes       Strength:    Joint: Eval Date:  Re-Assess Date:  Re-Assess Date:     RIGHT LEFT RIGHT LEFT RIGHT LEFT   Shoulder flexion:  5/5 5/5       Shoulder extension: 5/5 5/5       Shoulder abduction: 5/5 5/5       Shoulder IR: 5/5 5/5       Shoulder ER: 4+/5 5/5       Elbow flexion: 4+/5 5/5       Elbow extension: 5/5 5/5       Wrist flexion/extension: 4+/5 5/5           Special Tests: Assessed @ Initial Visit    Spurling's Test: Positive on right   Positive cranial flexion test with inability to stabilize at 22  Neurological Screen: Radiating symptoms? Yes     Functional Mobility:  Assessed @ Initial Visit   Balance:  Assessed @ Initial Visit      Body Structures Involved:  1. Nerves  2. Joints  3. Muscles  4. Ligaments Body Functions Affected:  1. Sensory/Pain  2. Neuromusculoskeletal  3. Movement Related Activities and Participation Affected:  1. General Tasks and Demands  2. Mobility  3. Self Care  4. Interpersonal Interactions and Relationships   Number of elements that affect the Plan of Care: 3: MODERATE COMPLEXITY   CLINICAL PRESENTATION:   Presentation: Stable and uncomplicated: LOW COMPLEXITY   CLINICAL DECISION MAKING:   Outcome Measure:    Tool Used: Neck Disability Index (NDI)  Score:  Initial: 15/50  Most Recent: X/50 (Date: -- ) Interpretation of Score: The Neck Disability Index is a revised form of the Oswestry Low Back Pain Index and is designed to measure the activities of daily living in person's with neck pain. Each section is scored on a 0-5 scale, 5 representing the greatest disability. The scores of each section are added together for a total score of 50. Score 0 1-10 11-20 21-30 31-40 41-49 50   Modifier CH CI CJ CK CL CM CN       Medical Necessity:   · Skilled intervention continues to be required due to address above deficits affecting participation in basic ADLs and overall functional tolerance. Reason for Services/Other Comments:  · Patient continues to require skilled intervention due to address above deficits affecting participation in basic ADLs and overall functional tolerance. Use of outcome tool(s) and clinical judgement create a POC that gives a: Clear prediction of patient's progress: LOW COMPLEXITY   TREATMENT:   (In addition to Assessment/Re-Assessment sessions the following treatments were rendered)  THERAPEUTIC EXERCISE: (15 minutes):  Exercises per grid below to improve mobility, strength, balance and coordination. Required minimal visual and verbal cues to promote proper body alignment and promote proper body posture. Progressed resistance, range, repetitions and complexity of movement as indicated.      Date:  5/1/2107 Date:  5/4/2017 Date:  5/8/2017   Activity/Exercise Parameters Parameters Parameters   Cranial flexion with cuff feed back x10'     Sidelying trunk rotation x5'     Seated chin tuck x5'     UBE   3'/3' 3'/3'   TB Rows  btb 2x20    TB B ER  rtb 2x20    Joint position error training  With visual laser feedback and multi-angle control x10' x10' with multi-angle tracking         MANUAL THERAPY: (15 minutes): Joint mobilization, Soft tissue mobilization and Manipulation was utilized and necessary because of the patient's restricted joint motion, painful spasm, loss of articular motion and restricted motion of soft tissue. Soft tissue mobilization to cervical spine and upper trapezius. Distraction manipulation to upper thoracic spine for improved upper thoracic extension. MODALITIES: (15 minutes): mechanical traction in supine hooklyin pounds max tension, 8 pounds least tension x 15'. Patient reported improved tension and pain free ROM after treatment. (Used abbreviations: MET - muscle energy technique; PNF - proprioceptive neuromuscular facilitation; NMR - neuromuscular re-education; AP - anterior to posterior; PA - posterior to anterior)    Pre-Treatment Assessment: Patient reports no change after last visit. Treatment/Session Assessment: Patient had improved ROM and Posture with cueing for improved lower scapular positioning. Improved pain after treatment. · Pain/ Symptoms: Initial:   3/10 Post Session:  1/10 ·   Compliance with Program/Exercises: Will assess as treatment progresses. · Recommendations/Intent for next treatment session: \"Next visit will focus on advancements to more challenging activities and reduction in assistance provided\".   Total Treatment Duration:  PT Patient Time In/Time Out  Time In: 1300  Time Out: [place], PT

## 2017-05-10 ENCOUNTER — HOSPITAL ENCOUNTER (OUTPATIENT)
Dept: PHYSICAL THERAPY | Age: 42
Discharge: HOME | End: 2017-05-10
Attending: FAMILY MEDICINE
Payer: COMMERCIAL

## 2017-05-10 PROCEDURE — 97140 MANUAL THERAPY 1/> REGIONS: CPT

## 2017-05-10 PROCEDURE — 97012 MECHANICAL TRACTION THERAPY: CPT

## 2017-05-10 PROCEDURE — 97110 THERAPEUTIC EXERCISES: CPT

## 2017-05-10 NOTE — PROGRESS NOTES
Antoinette Moore  : 1975 [facility] at [address]  [phone]   [phone]       OUTPATIENT PHYSICAL THERAPY: Daily Note 5/10/2017    ICD-10: Treatment Diagnosis: Cervicalgia (M54.2)   Precautions/Allergies:   Review of patient's allergies indicates no known allergies. Fall Risk Score: 1 (? 5 = High Risk)  MD Orders: Eval and Treat MEDICAL/REFERRING DIAGNOSIS:  Neck pain [M54.2]   DATE OF ONSET: six months  REFERRING PHYSICIAN: Nohemy Demarco MD  RETURN PHYSICIAN APPOINTMENT: TBD by patient      INITIAL ASSESSMENT:  Mr. Antoinette Moore has attended 1 physical therapy session including initial evaluation. Antoinette Moore presents with S/S of increased pain, decreased ROM, decreased strength in the right C6 myotomes, decreased functional tolerance consistent with S/S of cervical radicular symtomology. Antoinette Moore will benefit from home exercise program, therapeutic and postural strengthening exercises, manual therapeutic techniques (ie. Distraction, SOR, myofascial release/soft tissue mobilization) as appropriate to address Yifan Zamora Brendan's current condition. Antoinette Moore will benefit from skilled PT (medically necessary) to address above deficits affecting participation in basic ADLs and overall functional tolerance. PROBLEM LIST (Impacting functional limitations):  1. Decreased Strength  2. Decreased ADL/Functional Activities  3. Increased Pain  4. Decreased Activity Tolerance  5. Decreased Work Simplification/Energy Conservation Techniques  6. Decreased Flexibility/Joint Mobility  7. Decreased [place] with Home Exercise Program INTERVENTIONS PLANNED:  1. Balance Exercise  2. Bed Mobility  3. Cold  4. Cryotherapy  5. Heat  6. Home Exercise Program (HEP)  7. Manual Therapy  8. Neuromuscular Re-education/Strengthening  9. Range of Motion (ROM)  10.  Therapeutic Exercise/Strengthening  11. Transfer Training   TREATMENT PLAN:  Effective Dates: 5/1/2017 TO 8/1/2017. Frequency/Duration: 2 times a week for 6 weeks   GOALS: (Goals have been discussed and agreed upon with patient.)  SHORT-TERM FUNCTIONAL GOALS: Time Frame: 3 weeks  1. Brian Holder will report <=4/10 pain to cervical spine with performance of functional spinal mobility and rotation. 2.  Brian Holder will demonstrate improved NDI score, indicating spinal improvement from 15/50 to 8/50 affecting minimal to no difficulty with performance of cervical mobility and strengthening. [address] to be independent with initial HEP for cervical region and UE's. [address] will improve ROM to >=90% to assist with improved function during instrumental activities of daily living. [address] will improve MMT to >=4+/5 to all UE strengthening to return to PLOF and improve functional tolerance. DISCHARGE GOALS: Time Frame: 6 weeks  1. Brian Holder will report <=2/10 pain to cervical spine with performance of functional spinal mobility and rotation and minimal to no difficulty with such tasks. 2. Brian Holder will demonstrate improved NDI score, indicating spinal improvement from 8/50 to 4/50 affecting minimal to no difficulty with performance of cervical mobility and strengthening. 3. Brian Holder to be independent with advanced HEP for cervical region and UE's. Rehabilitation Potential For Stated Goals: Good  [facility], I certify that the treatment plan above will be carried out by a therapist or under their direction.   Thank you for this referral,  Mic Patterson PT     Referring Physician Signature: Clovis Coombs MD              Date                    HISTORY:   History of Present Injury/Illness (Reason for Referral):  Mr. Josie Yoon reports onset of right sided neck pain and arm numbness that began in the summer and has persisted. He reports that his symptoms are exacerbated by swinging a golf club, sitting with poor posture, turning looking to the right when driving, sleep when laying on his right side, and reading. He reports that his symptoms make driving during work duties difficult. His goals are to improved his pain, ROM, and be safe with sleep, home, and work duties. Past Medical History/Comorbidities:   Mr. Sarah Damico  has a past medical history of GERD (gastroesophageal reflux disease) and Hypertension. Mr. Sarah Damico  has a past surgical history that includes hernia repair. Social History/Living Environment:     lives with family in private residence  Prior Level of Function/Work/Activity:  Works for a [employer]  Previous Treatment Approaches:          Previously has had similar symptoms that resolved on their own a few years ago. Current Medications:    Current Outpatient Prescriptions:     naproxen (NAPROSYN) 500 mg tablet, Take 1 Tab by mouth two (2) times daily (with meals). Indications: Pain, Disp: 60 Tab, Rfl: 1    FLUARIX QUAD [id number], PF, syrg injection, TO BE ADMINISTERED BY PHARMACIST FOR IMMUNIZATION, Disp: , Rfl: 0    hyoscyamine (LEVSIN) 0.125 mg tablet, Take 1 Tab by mouth every six (6) hours as needed for Cramping or Diarrhea., Disp: 20 Tab, Rfl: 0    HYDROcodone-acetaminophen (NORCO) 5-325 mg per tablet, Take 1 Tab by mouth every four (4) hours as needed for Pain. Max Daily Amount: 6 Tabs., Disp: 15 Tab, Rfl: 0    omeprazole (PRILOSEC) 40 mg capsule, Take 1 Cap by mouth daily. Indications: GASTROESOPHAGEAL REFLUX, Disp: 90 Cap, Rfl: 12    amLODIPine (NORVASC) 5 mg tablet, Take 1 Tab by mouth daily.  Indications: Hypertension, Disp: 90 Tab, Rfl: 12     Date Last Reviewed:  5/10/2017     Number of Personal Factors/Comorbidities that affect the Plan of Care: 1-2: MODERATE COMPLEXITY   EXAMINATION:   Observation/Orthostatic Postural Assessment: Forward head posture with upper crossed syndrome present. Palpation:          Tenderness over the right cervical superficial extensors and upper trap. Tenderness to palpation at righ CT junction and upper thoracic segments with P/A glides  ROM:    Joint: Eval Date: 5/1/2017 Re-Assess Date: Re-Assess Date:         Cervical AROM      Flexion (% of normal): 100%     Extension (% of normal): 50%     L sidebending (% of normal): 75%     R sidebending (% of normal): 50% with pain     L rotation (% of normal): 75%      R rotation (% of normal): 50% with pain                 Pain at end-range or with AROM? Yes/No Yes     Any pain with overpressure? Yes/No Yes       Strength:    Joint: Eval Date:  Re-Assess Date:  Re-Assess Date:     RIGHT LEFT RIGHT LEFT RIGHT LEFT   Shoulder flexion:  5/5 5/5       Shoulder extension: 5/5 5/5       Shoulder abduction: 5/5 5/5       Shoulder IR: 5/5 5/5       Shoulder ER: 4+/5 5/5       Elbow flexion: 4+/5 5/5       Elbow extension: 5/5 5/5       Wrist flexion/extension: 4+/5 5/5           Special Tests: Assessed @ Initial Visit    Spurling's Test: Positive on right   Positive cranial flexion test with inability to stabilize at 22  Neurological Screen: Radiating symptoms? Yes     Functional Mobility:  Assessed @ Initial Visit   Balance:  Assessed @ Initial Visit      Body Structures Involved:  1. Nerves  2. Joints  3. Muscles  4. Ligaments Body Functions Affected:  1. Sensory/Pain  2. Neuromusculoskeletal  3. Movement Related Activities and Participation Affected:  1. General Tasks and Demands  2. Mobility  3. Self Care  4. Interpersonal Interactions and Relationships   Number of elements that affect the Plan of Care: 3: MODERATE COMPLEXITY   CLINICAL PRESENTATION:   Presentation: Stable and uncomplicated: LOW COMPLEXITY   CLINICAL DECISION MAKING:   Outcome Measure:    Tool Used: Neck Disability Index (NDI)  Score:  Initial: 15/50  Most Recent: X/50 (Date: -- ) Interpretation of Score: The Neck Disability Index is a revised form of the Oswestry Low Back Pain Index and is designed to measure the activities of daily living in person's with neck pain. Each section is scored on a 0-5 scale, 5 representing the greatest disability. The scores of each section are added together for a total score of 50. Score 0 1-10 11-20 21-30 31-40 41-49 50   Modifier CH CI CJ CK CL CM CN       Medical Necessity:   · Skilled intervention continues to be required due to address above deficits affecting participation in basic ADLs and overall functional tolerance. Reason for Services/Other Comments:  · Patient continues to require skilled intervention due to address above deficits affecting participation in basic ADLs and overall functional tolerance. Use of outcome tool(s) and clinical judgement create a POC that gives a: Clear prediction of patient's progress: LOW COMPLEXITY   TREATMENT:   (In addition to Assessment/Re-Assessment sessions the following treatments were rendered)  THERAPEUTIC EXERCISE: (15 minutes):  Exercises per grid below to improve mobility, strength, balance and coordination. Required minimal visual and verbal cues to promote proper body alignment and promote proper body posture. Progressed resistance, range, repetitions and complexity of movement as indicated.      Date:  5/1/2107 Date:  5/4/2017 Date:  5/8/2017 Date:   5/10/2017   Activity/Exercise Parameters Parameters Parameters    Cranial flexion with cuff feed back x10'      Sidelying trunk rotation x5'      Seated chin tuck x5'      UBE   3'/3' 3'/3' 3'/3'   TB Rows  btb 2x20     TB B ER  rtb 2x20     Joint position error training  With visual laser feedback and multi-angle control x10' x10' with multi-angle tracking    Ulnar nerve self mobilization    2x20   Doorway pec stretch       Wall slides for posture correction    2x10                MANUAL THERAPY: (20 minutes): Joint mobilization, Soft tissue mobilization and Manipulation was utilized and necessary because of the patient's restricted joint motion, painful spasm, loss of articular motion and restricted motion of soft tissue. Soft tissue mobilization to cervical spine and upper trapezius. Distraction manipulation to upper thoracic spine for improved upper thoracic extension. Instrument assisted soft tissue mobilization to right arm and cubital tunnel   MODALITIES: (15 minutes): mechanical traction in supine hooklyin pounds max tension, 8 pounds least tension x 15'. Patient reported improved tension and pain free ROM after treatment. (Used abbreviations: MET - muscle energy technique; PNF - proprioceptive neuromuscular facilitation; NMR - neuromuscular re-education; AP - anterior to posterior; PA - posterior to anterior)    Pre-Treatment Assessment: Patient reports some decreased frequency of right arm tingling. Treatment/Session Assessment: Patient had improved ROM and Posture with cueing for improved lower scapular positioning. Improved pain after treatment and improved right arm symptoms. · Pain/ Symptoms: Initial:   4/10 Post Session:  1/10 ·   Compliance with Program/Exercises: Will assess as treatment progresses. · Recommendations/Intent for next treatment session: \"Next visit will focus on advancements to more challenging activities and reduction in assistance provided\".   Total Treatment Duration:  PT Patient Time In/Time Out  Time In: 1200  Time Out: [address], PT

## 2017-05-15 ENCOUNTER — HOSPITAL ENCOUNTER (OUTPATIENT)
Dept: PHYSICAL THERAPY | Age: 42
Discharge: HOME | End: 2017-05-15
Attending: FAMILY MEDICINE
Payer: COMMERCIAL

## 2017-05-15 NOTE — PROGRESS NOTES
Wesley Ly  : 1975 [facility] at [facility]  [address] [facility], [address] [place] [address], [address]  [phone]   [phone]        OUTPATIENT DAILY NOTE    NAME/AGE/GENDER: Wesley Ly is a 35 y.o. male. DATE: 5/15/2017    Patient cancel for appointment today due to work conflict. Will plan to follow up on next scheduled visit.     Yusef Thompson, PT

## 2017-05-17 ENCOUNTER — HOSPITAL ENCOUNTER (OUTPATIENT)
Dept: PHYSICAL THERAPY | Age: 42
Discharge: HOME | End: 2017-05-17
Attending: FAMILY MEDICINE
Payer: COMMERCIAL

## 2017-05-17 PROCEDURE — 97012 MECHANICAL TRACTION THERAPY: CPT

## 2017-05-17 PROCEDURE — 97140 MANUAL THERAPY 1/> REGIONS: CPT

## 2017-05-17 NOTE — PROGRESS NOTES
Damir Magallanes  : 1975 [facility] at [address]  [phone]   [phone]       OUTPATIENT PHYSICAL THERAPY: Daily Note 2017    ICD-10: Treatment Diagnosis: Cervicalgia (M54.2)   Precautions/Allergies:   Review of patient's allergies indicates no known allergies. Fall Risk Score: 1 (? 5 = High Risk)  MD Orders: Eval and Treat MEDICAL/REFERRING DIAGNOSIS:  Neck pain [M54.2]   DATE OF ONSET: six months  REFERRING PHYSICIAN: Marco Antonio Calderon MD  RETURN PHYSICIAN APPOINTMENT: TBD by patient      INITIAL ASSESSMENT:  Mr. Damir Magallanes has attended 1 physical therapy session including initial evaluation. Damir Magallanes presents with S/S of increased pain, decreased ROM, decreased strength in the right C6 myotomes, decreased functional tolerance consistent with S/S of cervical radicular symtomology. Damir Magallanes will benefit from home exercise program, therapeutic and postural strengthening exercises, manual therapeutic techniques (ie. Distraction, SOR, myofascial release/soft tissue mobilization) as appropriate to address Benjie Sweet Brendan's current condition. Damir Magallanes will benefit from skilled PT (medically necessary) to address above deficits affecting participation in basic ADLs and overall functional tolerance. PROBLEM LIST (Impacting functional limitations):  1. Decreased Strength  2. Decreased ADL/Functional Activities  3. Increased Pain  4. Decreased Activity Tolerance  5. Decreased Work Simplification/Energy Conservation Techniques  6. Decreased Flexibility/Joint Mobility  7. Decreased [place] with Home Exercise Program INTERVENTIONS PLANNED:  1. Balance Exercise  2. Bed Mobility  3. Cold  4. Cryotherapy  5. Heat  6. Home Exercise Program (HEP)  7. Manual Therapy  8. Neuromuscular Re-education/Strengthening  9. Range of Motion (ROM)  10.  Therapeutic Exercise/Strengthening  11. Transfer Training   TREATMENT PLAN:  Effective Dates: 5/1/2017 TO 8/1/2017. Frequency/Duration: 2 times a week for 6 weeks   GOALS: (Goals have been discussed and agreed upon with patient.)  SHORT-TERM FUNCTIONAL GOALS: Time Frame: 3 weeks  1. Piero Fitzpatrick will report <=4/10 pain to cervical spine with performance of functional spinal mobility and rotation. 2.  Piero Fitzpatrick will demonstrate improved NDI score, indicating spinal improvement from 15/50 to 8/50 affecting minimal to no difficulty with performance of cervical mobility and strengthening. [address] to be independent with initial HEP for cervical region and UE's. [address] will improve ROM to >=90% to assist with improved function during instrumental activities of daily living. [address] will improve MMT to >=4+/5 to all UE strengthening to return to PLOF and improve functional tolerance. DISCHARGE GOALS: Time Frame: 6 weeks  1. Piero Fitzpatrick will report <=2/10 pain to cervical spine with performance of functional spinal mobility and rotation and minimal to no difficulty with such tasks. 2. Piero Fitzpatrick will demonstrate improved NDI score, indicating spinal improvement from 8/50 to 4/50 affecting minimal to no difficulty with performance of cervical mobility and strengthening. 3. Piero Fitzpatrick to be independent with advanced HEP for cervical region and UE's. Rehabilitation Potential For Stated Goals: Good  Regarding Dani Feldman Brendan's therapy, I certify that the treatment plan above will be carried out by a therapist or under their direction.   Thank you for this referral,  Lucho Echeverria PT     Referring Physician Signature: Shirin Brooke MD              Date                    HISTORY:   History of Present Injury/Illness (Reason for Referral):  Mr. Stepan Mary reports onset of right sided neck pain and arm numbness that began in the summer and has persisted. He reports that his symptoms are exacerbated by swinging a golf club, sitting with poor posture, turning looking to the right when driving, sleep when laying on his right side, and reading. He reports that his symptoms make driving during work duties difficult. His goals are to improved his pain, ROM, and be safe with sleep, home, and work duties. Past Medical History/Comorbidities:   Mr. Ruddy Lambert  has a past medical history of GERD (gastroesophageal reflux disease) and Hypertension. Mr. Ruddy Lambert  has a past surgical history that includes hernia repair. Social History/Living Environment:     lives with family in private residence  Prior Level of Function/Work/Activity:  Works for a [employer]  Previous Treatment Approaches:          Previously has had similar symptoms that resolved on their own a few years ago. Current Medications:    Current Outpatient Prescriptions:     naproxen (NAPROSYN) 500 mg tablet, Take 1 Tab by mouth two (2) times daily (with meals). Indications: Pain, Disp: 60 Tab, Rfl: 1    FLUARIX QUAD [id number], PF, syrg injection, TO BE ADMINISTERED BY PHARMACIST FOR IMMUNIZATION, Disp: , Rfl: 0    hyoscyamine (LEVSIN) 0.125 mg tablet, Take 1 Tab by mouth every six (6) hours as needed for Cramping or Diarrhea., Disp: 20 Tab, Rfl: 0    HYDROcodone-acetaminophen (NORCO) 5-325 mg per tablet, Take 1 Tab by mouth every four (4) hours as needed for Pain. Max Daily Amount: 6 Tabs., Disp: 15 Tab, Rfl: 0    omeprazole (PRILOSEC) 40 mg capsule, Take 1 Cap by mouth daily. Indications: GASTROESOPHAGEAL REFLUX, Disp: 90 Cap, Rfl: 12    amLODIPine (NORVASC) 5 mg tablet, Take 1 Tab by mouth daily.  Indications: Hypertension, Disp: 90 Tab, Rfl: 12     Date Last Reviewed:  5/17/2017     Number of Personal Factors/Comorbidities that affect the Plan of Care: 1-2: MODERATE COMPLEXITY   EXAMINATION:   Observation/Orthostatic Postural Assessment: Forward head posture with upper crossed syndrome present. Palpation:          Tenderness over the right cervical superficial extensors and upper trap. Tenderness to palpation at righ CT junction and upper thoracic segments with P/A glides  ROM:    Joint: Eval Date: 5/1/2017 Re-Assess Date: Re-Assess Date:         Cervical AROM      Flexion (% of normal): 100%     Extension (% of normal): 50%     L sidebending (% of normal): 75%     R sidebending (% of normal): 50% with pain     L rotation (% of normal): 75%      R rotation (% of normal): 50% with pain                 Pain at end-range or with AROM? Yes/No Yes     Any pain with overpressure? Yes/No Yes       Strength:    Joint: Eval Date:  Re-Assess Date:  Re-Assess Date:     RIGHT LEFT RIGHT LEFT RIGHT LEFT   Shoulder flexion:  5/5 5/5       Shoulder extension: 5/5 5/5       Shoulder abduction: 5/5 5/5       Shoulder IR: 5/5 5/5       Shoulder ER: 4+/5 5/5       Elbow flexion: 4+/5 5/5       Elbow extension: 5/5 5/5       Wrist flexion/extension: 4+/5 5/5           Special Tests: Assessed @ Initial Visit    Spurling's Test: Positive on right   Positive cranial flexion test with inability to stabilize at 22  Neurological Screen: Radiating symptoms? Yes     Functional Mobility:  Assessed @ Initial Visit   Balance:  Assessed @ Initial Visit      Body Structures Involved:  1. Nerves  2. Joints  3. Muscles  4. Ligaments Body Functions Affected:  1. Sensory/Pain  2. Neuromusculoskeletal  3. Movement Related Activities and Participation Affected:  1. General Tasks and Demands  2. Mobility  3. Self Care  4. Interpersonal Interactions and Relationships   Number of elements that affect the Plan of Care: 3: MODERATE COMPLEXITY   CLINICAL PRESENTATION:   Presentation: Stable and uncomplicated: LOW COMPLEXITY   CLINICAL DECISION MAKING:   Outcome Measure:    Tool Used: Neck Disability Index (NDI)  Score:  Initial: 15/50  Most Recent: X/50 (Date: -- ) Interpretation of Score: The Neck Disability Index is a revised form of the Oswestry Low Back Pain Index and is designed to measure the activities of daily living in person's with neck pain. Each section is scored on a 0-5 scale, 5 representing the greatest disability. The scores of each section are added together for a total score of 50. Score 0 1-10 11-20 21-30 31-40 41-49 50   Modifier CH CI CJ CK CL CM CN       Medical Necessity:   · Skilled intervention continues to be required due to address above deficits affecting participation in basic ADLs and overall functional tolerance. Reason for Services/Other Comments:  · Patient continues to require skilled intervention due to address above deficits affecting participation in basic ADLs and overall functional tolerance. Use of outcome tool(s) and clinical judgement create a POC that gives a: Clear prediction of patient's progress: LOW COMPLEXITY   TREATMENT:   (In addition to Assessment/Re-Assessment sessions the following treatments were rendered)  THERAPEUTIC EXERCISE: (0 minutes):  Exercises per grid below to improve mobility, strength, balance and coordination. Required minimal visual and verbal cues to promote proper body alignment and promote proper body posture. Progressed resistance, range, repetitions and complexity of movement as indicated.      Date:  5/1/2107 Date:  5/4/2017 Date:  5/8/2017 Date:   5/10/2017   Activity/Exercise Parameters Parameters Parameters    Cranial flexion with cuff feed back x10'      Sidelying trunk rotation x5'      Seated chin tuck x5'      UBE   3'/3' 3'/3' 3'/3'   TB Rows  btb 2x20     TB B ER  rtb 2x20     Joint position error training  With visual laser feedback and multi-angle control x10' x10' with multi-angle tracking    Ulnar nerve self mobilization    2x20   Doorway pec stretch       Wall slides for posture correction    2x10                MANUAL THERAPY: (25 minutes): Joint mobilization, Soft tissue mobilization and Manipulation was utilized and necessary because of the patient's restricted joint motion, painful spasm, loss of articular motion and restricted motion of soft tissue. Soft tissue mobilization to cervical spine and upper trapezius. Distraction manipulation to upper thoracic spine for improved upper thoracic extension. Instrument assisted soft tissue mobilization to right arm and cubital tunnel   MODALITIES: (15 minutes): mechanical traction in supine hooklyin pounds max tension, 8 pounds least tension x 15'. Patient reported improved tension and pain free ROM after treatment. (Used abbreviations: MET - muscle energy technique; PNF - proprioceptive neuromuscular facilitation; NMR - neuromuscular re-education; AP - anterior to posterior; PA - posterior to anterior)    Pre-Treatment Assessment: Patient reports improving pain overall and decreased symptoms with riding his motor cycle. Treatment/Session Assessment: Patient had improved ROM and Posture with cueing for improved lower scapular positioning. Improved pain after treatment and improved right arm symptoms. · Pain/ Symptoms: Initial:   4/10 Post Session:  1/10 ·   Compliance with Program/Exercises: Will assess as treatment progresses. · Recommendations/Intent for next treatment session: \"Next visit will focus on advancements to more challenging activities and reduction in assistance provided\".   Total Treatment Duration:  PT Patient Time In/Time Out  Time In: 1305  Time Out: [address], PT

## 2017-05-24 ENCOUNTER — HOSPITAL ENCOUNTER (OUTPATIENT)
Dept: PHYSICAL THERAPY | Age: 42
Discharge: HOME | End: 2017-05-24
Attending: FAMILY MEDICINE
Payer: COMMERCIAL

## 2017-05-24 PROCEDURE — 97110 THERAPEUTIC EXERCISES: CPT

## 2017-05-24 PROCEDURE — 97140 MANUAL THERAPY 1/> REGIONS: CPT

## 2017-05-24 PROCEDURE — 97012 MECHANICAL TRACTION THERAPY: CPT

## 2017-05-24 NOTE — PROGRESS NOTES
Royce Alfaro  : 1975 [facility] at [address]  [phone]   [phone]       OUTPATIENT PHYSICAL THERAPY: Daily Note 2017    ICD-10: Treatment Diagnosis: Cervicalgia (M54.2)   Precautions/Allergies:   Review of patient's allergies indicates no known allergies. Fall Risk Score: 1 (? 5 = High Risk)  MD Orders: Eval and Treat MEDICAL/REFERRING DIAGNOSIS:  Neck pain [M54.2]   DATE OF ONSET: six months  REFERRING PHYSICIAN: Shira Kaur MD  RETURN PHYSICIAN APPOINTMENT: TBD by patient      INITIAL ASSESSMENT:  Mr. Royce Alfaro has attended 1 physical therapy session including initial evaluation. Royce Alfaro presents with S/S of increased pain, decreased ROM, decreased strength in the right C6 myotomes, decreased functional tolerance consistent with S/S of cervical radicular symtomology. Royce Alfaro will benefit from home exercise program, therapeutic and postural strengthening exercises, manual therapeutic techniques (ie. Distraction, SOR, myofascial release/soft tissue mobilization) as appropriate to address Mallika Mcqueens's current condition. Royce Alfaro will benefit from skilled PT (medically necessary) to address above deficits affecting participation in basic ADLs and overall functional tolerance. PROBLEM LIST (Impacting functional limitations):  1. Decreased Strength  2. Decreased ADL/Functional Activities  3. Increased Pain  4. Decreased Activity Tolerance  5. Decreased Work Simplification/Energy Conservation Techniques  6. Decreased Flexibility/Joint Mobility  7. Decreased [place] with Home Exercise Program INTERVENTIONS PLANNED:  1. Balance Exercise  2. Bed Mobility  3. Cold  4. Cryotherapy  5. Heat  6. Home Exercise Program (HEP)  7. Manual Therapy  8. Neuromuscular Re-education/Strengthening  9. Range of Motion (ROM)  10.  Therapeutic Exercise/Strengthening  11. Transfer Training   TREATMENT PLAN:  Effective Dates: 5/1/2017 TO 8/1/2017. Frequency/Duration: 2 times a week for 6 weeks   GOALS: (Goals have been discussed and agreed upon with patient.)  SHORT-TERM FUNCTIONAL GOALS: Time Frame: 3 weeks  1. Royce Alfaro will report <=4/10 pain to cervical spine with performance of functional spinal mobility and rotation. 2.  Royce Alfaro will demonstrate improved NDI score, indicating spinal improvement from 15/50 to 8/50 affecting minimal to no difficulty with performance of cervical mobility and strengthening. [address] to be independent with initial HEP for cervical region and UE's. [address] will improve ROM to >=90% to assist with improved function during instrumental activities of daily living. [address] will improve MMT to >=4+/5 to all UE strengthening to return to PLOF and improve functional tolerance. DISCHARGE GOALS: Time Frame: 6 weeks  1. Royce Alfaro will report <=2/10 pain to cervical spine with performance of functional spinal mobility and rotation and minimal to no difficulty with such tasks. 2. Royce Alfaro will demonstrate improved NDI score, indicating spinal improvement from 8/50 to 4/50 affecting minimal to no difficulty with performance of cervical mobility and strengthening. 3. Royce Alfaro to be independent with advanced HEP for cervical region and UE's. Rehabilitation Potential For Stated Goals: Good  Regarding Mallika Hurleymann Brendan's therapy, I certify that the treatment plan above will be carried out by a therapist or under their direction.   Thank you for this referral,  Andriy Hardin PT     Referring Physician Signature: Shira Kaur MD              Date                    HISTORY:   History of Present Injury/Illness (Reason for Referral):  Mr. Benjamin Carrera reports onset of right sided neck pain and arm numbness that began in the summer and has persisted. He reports that his symptoms are exacerbated by swinging a golf club, sitting with poor posture, turning looking to the right when driving, sleep when laying on his right side, and reading. He reports that his symptoms make driving during work duties difficult. His goals are to improved his pain, ROM, and be safe with sleep, home, and work duties. Past Medical History/Comorbidities:   Mr. Mariella Pablo  has a past medical history of GERD (gastroesophageal reflux disease) and Hypertension. Mr. Mariella Pablo  has a past surgical history that includes hernia repair. Social History/Living Environment:     lives with family in private residence  Prior Level of Function/Work/Activity:  Works for a [employer]  Previous Treatment Approaches:          Previously has had similar symptoms that resolved on their own a few years ago. Current Medications:    Current Outpatient Prescriptions:     naproxen (NAPROSYN) 500 mg tablet, Take 1 Tab by mouth two (2) times daily (with meals). Indications: Pain, Disp: 60 Tab, Rfl: 1    FLUARIX QUAD 0231-1208, PF, syrg injection, TO BE ADMINISTERED BY PHARMACIST FOR IMMUNIZATION, Disp: , Rfl: 0    hyoscyamine (LEVSIN) 0.125 mg tablet, Take 1 Tab by mouth every six (6) hours as needed for Cramping or Diarrhea., Disp: 20 Tab, Rfl: 0    HYDROcodone-acetaminophen (NORCO) 5-325 mg per tablet, Take 1 Tab by mouth every four (4) hours as needed for Pain. Max Daily Amount: 6 Tabs., Disp: 15 Tab, Rfl: 0    omeprazole (PRILOSEC) 40 mg capsule, Take 1 Cap by mouth daily. Indications: GASTROESOPHAGEAL REFLUX, Disp: 90 Cap, Rfl: 12    amLODIPine (NORVASC) 5 mg tablet, Take 1 Tab by mouth daily.  Indications: Hypertension, Disp: 90 Tab, Rfl: 12     Date Last Reviewed:  5/24/2017     Number of Personal Factors/Comorbidities that affect the Plan of Care: 1-2: MODERATE COMPLEXITY   EXAMINATION:   Observation/Orthostatic Postural Assessment: Forward head posture with upper crossed syndrome present. Palpation:          Tenderness over the right cervical superficial extensors and upper trap. Tenderness to palpation at righ CT junction and upper thoracic segments with P/A glides  ROM:    Joint: Eval Date: 5/1/2017 Re-Assess Date: Re-Assess Date:         Cervical AROM      Flexion (% of normal): 100%     Extension (% of normal): 50%     L sidebending (% of normal): 75%     R sidebending (% of normal): 50% with pain     L rotation (% of normal): 75%      R rotation (% of normal): 50% with pain                 Pain at end-range or with AROM? Yes/No Yes     Any pain with overpressure? Yes/No Yes       Strength:    Joint: Eval Date:  Re-Assess Date:  Re-Assess Date:     RIGHT LEFT RIGHT LEFT RIGHT LEFT   Shoulder flexion:  5/5 5/5       Shoulder extension: 5/5 5/5       Shoulder abduction: 5/5 5/5       Shoulder IR: 5/5 5/5       Shoulder ER: 4+/5 5/5       Elbow flexion: 4+/5 5/5       Elbow extension: 5/5 5/5       Wrist flexion/extension: 4+/5 5/5           Special Tests: Assessed @ Initial Visit    Spurling's Test: Positive on right   Positive cranial flexion test with inability to stabilize at 22  Neurological Screen: Radiating symptoms? Yes     Functional Mobility:  Assessed @ Initial Visit   Balance:  Assessed @ Initial Visit      Body Structures Involved:  1. Nerves  2. Joints  3. Muscles  4. Ligaments Body Functions Affected:  1. Sensory/Pain  2. Neuromusculoskeletal  3. Movement Related Activities and Participation Affected:  1. General Tasks and Demands  2. Mobility  3. Self Care  4. Interpersonal Interactions and Relationships   Number of elements that affect the Plan of Care: 3: MODERATE COMPLEXITY   CLINICAL PRESENTATION:   Presentation: Stable and uncomplicated: LOW COMPLEXITY   CLINICAL DECISION MAKING:   Outcome Measure:    Tool Used: Neck Disability Index (NDI)  Score:  Initial: 15/50  Most Recent: X/50 (Date: -- ) Interpretation of Score: The Neck Disability Index is a revised form of the Oswestry Low Back Pain Index and is designed to measure the activities of daily living in person's with neck pain. Each section is scored on a 0-5 scale, 5 representing the greatest disability. The scores of each section are added together for a total score of 50. Score 0 1-10 11-20 21-30 31-40 41-49 50   Modifier CH CI CJ CK CL CM CN       Medical Necessity:   · Skilled intervention continues to be required due to address above deficits affecting participation in basic ADLs and overall functional tolerance. Reason for Services/Other Comments:  · Patient continues to require skilled intervention due to address above deficits affecting participation in basic ADLs and overall functional tolerance. Use of outcome tool(s) and clinical judgement create a POC that gives a: Clear prediction of patient's progress: LOW COMPLEXITY   TREATMENT:   (In addition to Assessment/Re-Assessment sessions the following treatments were rendered)  THERAPEUTIC EXERCISE: (15 minutes):  Exercises per grid below to improve mobility, strength, balance and coordination. Required minimal visual and verbal cues to promote proper body alignment and promote proper body posture. Progressed resistance, range, repetitions and complexity of movement as indicated.      Date:  5/1/2107 Date:  5/4/2017 Date:  5/8/2017 Date:   5/10/2017 Date:  5/24/2017   Activity/Exercise Parameters Parameters Parameters     Cranial flexion with cuff feed back x10'       Sidelying trunk rotation x5'       Seated chin tuck x5'       UBE   3'/3' 3'/3' 3'/3' 3'/3'   TB Rows  btb 2x20      TB B ER  rtb 2x20      Joint position error training  With visual laser feedback and multi-angle control x10' x10' with multi-angle tracking  x10'   Ulnar nerve self mobilization    2x20    Doorway pec stretch        Wall slides for posture correction    2x10                  MANUAL THERAPY: (15 minutes): Joint mobilization, Soft tissue mobilization and Manipulation was utilized and necessary because of the patient's restricted joint motion, painful spasm, loss of articular motion and restricted motion of soft tissue. Soft tissue mobilization to cervical spine and upper trapezius. Instrument assisted soft tissue mobilization to right arm and cubital tunnel   MODALITIES: (15 minutes): mechanical traction in supine hooklyin pounds max tension, 8 pounds least tension x 15'. Patient reported improved tension and pain free ROM after treatment. (Used abbreviations: MET - muscle energy technique; PNF - proprioceptive neuromuscular facilitation; NMR - neuromuscular re-education; AP - anterior to posterior; PA - posterior to anterior)    Pre-Treatment Assessment: Patient reports improving pain overall and decreased symptoms during all home and work activities. Treatment/Session Assessment: Patient had improved ROM and Posture with cueing and improved stability with JPET exercises. .  · Pain/ Symptoms: Initial:   2/10 Post Session:  0/10 ·   Compliance with Program/Exercises: Will assess as treatment progresses. · Recommendations/Intent for next treatment session: \"Next visit will focus on advancements to more challenging activities and reduction in assistance provided\".   Total Treatment Duration:  PT Patient Time In/Time Out  Time In: 1300  Time Out: [place], PT

## 2017-06-02 ENCOUNTER — HOSPITAL ENCOUNTER (OUTPATIENT)
Dept: PHYSICAL THERAPY | Age: 42
Discharge: HOME | End: 2017-06-02
Attending: FAMILY MEDICINE
Payer: COMMERCIAL

## 2017-06-02 PROCEDURE — 97012 MECHANICAL TRACTION THERAPY: CPT

## 2017-06-02 PROCEDURE — 97110 THERAPEUTIC EXERCISES: CPT

## 2017-06-02 PROCEDURE — 97140 MANUAL THERAPY 1/> REGIONS: CPT

## 2017-06-02 NOTE — PROGRESS NOTES
Vane Sung  : 1975 [facility] at [facility] [address]  [address] [place] [address]  [phone]   [phone]       OUTPATIENT PHYSICAL THERAPY: Daily Note 2017    ICD-10: Treatment Diagnosis: Cervicalgia (M54.2)   Precautions/Allergies:   Review of patient's allergies indicates no known allergies. Fall Risk Score: 1 (? 5 = High Risk)  MD Orders: Eval and Treat MEDICAL/REFERRING DIAGNOSIS:  Neck pain [M54.2]   DATE OF ONSET: six months  REFERRING PHYSICIAN: Sanam Alfaro MD  RETURN PHYSICIAN APPOINTMENT: TBD by patient      INITIAL ASSESSMENT:  Mr. Vane Sung has attended 1 physical therapy session including initial evaluation. Vane Sung presents with S/S of increased pain, decreased ROM, decreased strength in the right C6 myotomes, decreased functional tolerance consistent with S/S of cervical radicular symtomology. Vane Sung will benefit from home exercise program, therapeutic and postural strengthening exercises, manual therapeutic techniques (ie. Distraction, SOR, myofascial release/soft tissue mobilization) as appropriate to address Brittany Cardona's current condition. Vane Sung will benefit from skilled PT (medically necessary) to address above deficits affecting participation in basic ADLs and overall functional tolerance. PROBLEM LIST (Impacting functional limitations):  1. Decreased Strength  2. Decreased ADL/Functional Activities  3. Increased Pain  4. Decreased Activity Tolerance  5. Decreased Work Simplification/Energy Conservation Techniques  6. Decreased Flexibility/Joint Mobility  7. Decreased [place] with Home Exercise Program INTERVENTIONS PLANNED:  1. Balance Exercise  2. Bed Mobility  3. Cold  4. Cryotherapy  5. Heat  6. Home Exercise Program (HEP)  7. Manual Therapy  8. Neuromuscular Re-education/Strengthening  9. Range of Motion (ROM)  10.  Therapeutic Exercise/Strengthening  11. Transfer Training   TREATMENT PLAN:  Effective Dates: 5/1/2017 TO 8/1/2017. Frequency/Duration: 2 times a week for 6 weeks   GOALS: (Goals have been discussed and agreed upon with patient.)  SHORT-TERM FUNCTIONAL GOALS: Time Frame: 3 weeks  1. Lolita Motley will report <=4/10 pain to cervical spine with performance of functional spinal mobility and rotation. 2.  Lolita Motley will demonstrate improved NDI score, indicating spinal improvement from 15/50 to 8/50 affecting minimal to no difficulty with performance of cervical mobility and strengthening. [address] to be independent with initial HEP for cervical region and UE's. [address] will improve ROM to >=90% to assist with improved function during instrumental activities of daily living. [address] will improve MMT to >=4+/5 to all UE strengthening to return to PLOF and improve functional tolerance. DISCHARGE GOALS: Time Frame: 6 weeks  1. Lolita Motley will report <=2/10 pain to cervical spine with performance of functional spinal mobility and rotation and minimal to no difficulty with such tasks. 2. Lolita Motley will demonstrate improved NDI score, indicating spinal improvement from 8/50 to 4/50 affecting minimal to no difficulty with performance of cervical mobility and strengthening. 3. Lolita Motley to be independent with advanced HEP for cervical region and UE's. Rehabilitation Potential For Stated Goals: Good  Regarding Patrick Cardona's therapy, I certify that the treatment plan above will be carried out by a therapist or under their direction.   Thank you for this referral,  Beatriz Mcduffie PT     Referring Physician Signature: Leona Guadarrama MD              Date                    HISTORY:   History of Present Injury/Illness (Reason for Referral):  Mr. Chayo Tran reports onset of right sided neck pain and arm numbness that began in the summer and has persisted. He reports that his symptoms are exacerbated by swinging a golf club, sitting with poor posture, turning looking to the right when driving, sleep when laying on his right side, and reading. He reports that his symptoms make driving during work duties difficult. His goals are to improved his pain, ROM, and be safe with sleep, home, and work duties. Past Medical History/Comorbidities:   Mr. Mariella Pablo  has a past medical history of GERD (gastroesophageal reflux disease) and Hypertension. Mr. Mariella Pablo  has a past surgical history that includes hernia repair. Social History/Living Environment:     lives with family in private residence  Prior Level of Function/Work/Activity:  Works for a [employer]  Previous Treatment Approaches:          Previously has had similar symptoms that resolved on their own a few years ago. Current Medications:    Current Outpatient Prescriptions:     naproxen (NAPROSYN) 500 mg tablet, Take 1 Tab by mouth two (2) times daily (with meals). Indications: Pain, Disp: 60 Tab, Rfl: 1    FLUARIX QUAD [id number], PF, syrg injection, TO BE ADMINISTERED BY PHARMACIST FOR IMMUNIZATION, Disp: , Rfl: 0    hyoscyamine (LEVSIN) 0.125 mg tablet, Take 1 Tab by mouth every six (6) hours as needed for Cramping or Diarrhea., Disp: 20 Tab, Rfl: 0    HYDROcodone-acetaminophen (NORCO) 5-325 mg per tablet, Take 1 Tab by mouth every four (4) hours as needed for Pain. Max Daily Amount: 6 Tabs., Disp: 15 Tab, Rfl: 0    omeprazole (PRILOSEC) 40 mg capsule, Take 1 Cap by mouth daily. Indications: GASTROESOPHAGEAL REFLUX, Disp: 90 Cap, Rfl: 12    amLODIPine (NORVASC) 5 mg tablet, Take 1 Tab by mouth daily.  Indications: Hypertension, Disp: 90 Tab, Rfl: 12     Date Last Reviewed:  6/2/2017     Number of Personal Factors/Comorbidities that affect the Plan of Care: 1-2: MODERATE COMPLEXITY   EXAMINATION:   Observation/Orthostatic Postural Assessment: Forward head posture with upper crossed syndrome present. Palpation:          Tenderness over the right cervical superficial extensors and upper trap. Tenderness to palpation at righ CT junction and upper thoracic segments with P/A glides  ROM:    Joint: Eval Date: 5/1/2017 Re-Assess Date: Re-Assess Date:         Cervical AROM      Flexion (% of normal): 100%     Extension (% of normal): 50%     L sidebending (% of normal): 75%     R sidebending (% of normal): 50% with pain     L rotation (% of normal): 75%      R rotation (% of normal): 50% with pain                 Pain at end-range or with AROM? Yes/No Yes     Any pain with overpressure? Yes/No Yes       Strength:    Joint: Eval Date:  Re-Assess Date:  Re-Assess Date:     RIGHT LEFT RIGHT LEFT RIGHT LEFT   Shoulder flexion:  5/5 5/5       Shoulder extension: 5/5 5/5       Shoulder abduction: 5/5 5/5       Shoulder IR: 5/5 5/5       Shoulder ER: 4+/5 5/5       Elbow flexion: 4+/5 5/5       Elbow extension: 5/5 5/5       Wrist flexion/extension: 4+/5 5/5           Special Tests: Assessed @ Initial Visit    Spurling's Test: Positive on right   Positive cranial flexion test with inability to stabilize at 22  Neurological Screen: Radiating symptoms? Yes     Functional Mobility:  Assessed @ Initial Visit   Balance:  Assessed @ Initial Visit      Body Structures Involved:  1. Nerves  2. Joints  3. Muscles  4. Ligaments Body Functions Affected:  1. Sensory/Pain  2. Neuromusculoskeletal  3. Movement Related Activities and Participation Affected:  1. General Tasks and Demands  2. Mobility  3. Self Care  4. Interpersonal Interactions and Relationships   Number of elements that affect the Plan of Care: 3: MODERATE COMPLEXITY   CLINICAL PRESENTATION:   Presentation: Stable and uncomplicated: LOW COMPLEXITY   CLINICAL DECISION MAKING:   Outcome Measure:    Tool Used: Neck Disability Index (NDI)  Score:  Initial: 15/50  Most Recent: X/50 (Date: -- ) Interpretation of Score: The Neck Disability Index is a revised form of the Oswestry Low Back Pain Index and is designed to measure the activities of daily living in person's with neck pain. Each section is scored on a 0-5 scale, 5 representing the greatest disability. The scores of each section are added together for a total score of 50. Score 0 1-10 11-20 21-30 31-40 41-49 50   Modifier CH CI CJ CK CL CM CN       Medical Necessity:   · Skilled intervention continues to be required due to address above deficits affecting participation in basic ADLs and overall functional tolerance. Reason for Services/Other Comments:  · Patient continues to require skilled intervention due to address above deficits affecting participation in basic ADLs and overall functional tolerance. Use of outcome tool(s) and clinical judgement create a POC that gives a: Clear prediction of patient's progress: LOW COMPLEXITY   TREATMENT:   (In addition to Assessment/Re-Assessment sessions the following treatments were rendered)  THERAPEUTIC EXERCISE: (15 minutes):  Exercises per grid below to improve mobility, strength, balance and coordination. Required minimal visual and verbal cues to promote proper body alignment and promote proper body posture. Progressed resistance, range, repetitions and complexity of movement as indicated.      Date:  5/1/2107 Date:  5/4/2017 Date:  5/8/2017 Date:   5/10/2017 Date:  5/24/2017 Date:  6/2/2017   Activity/Exercise Parameters Parameters Parameters      Cranial flexion with cuff feed back x10'        Sidelying trunk rotation x5'        Seated chin tuck x5'     x5'   UBE   3'/3' 3'/3' 3'/3' 3'/3' 3'/3'   TB Rows  btb 2x20       TB B ER  rtb 2x20    2x20   Joint position error training  With visual laser feedback and multi-angle control x10' x10' with multi-angle tracking  x10'    Ulnar nerve self mobilization    2x20     Doorway pec stretch         Wall slides for posture correction    2x10 MANUAL THERAPY: (15 minutes): Joint mobilization, Soft tissue mobilization and Manipulation was utilized and necessary because of the patient's restricted joint motion, painful spasm, loss of articular motion and restricted motion of soft tissue. Soft tissue mobilization to cervical spine and upper trapezius. Instrument assisted soft tissue mobilization to right arm and cubital tunnel   MODALITIES: (15 minutes): mechanical traction in supine hooklyin pounds max tension, 8 pounds least tension x 15'. Patient reported improved tension and pain free ROM after treatment. (Used abbreviations: MET - muscle energy technique; PNF - proprioceptive neuromuscular facilitation; NMR - neuromuscular re-education; AP - anterior to posterior; PA - posterior to anterior)    Pre-Treatment Assessment: Patient reports decreased frequency and intensity of symptoms when he is consistent with posture position during computer use  Treatment/Session Assessment: Patient had no symptoms after treatment. · Pain/ Symptoms: Initial:   2/10 Post Session:  0/10 ·   Compliance with Program/Exercises: Will assess as treatment progresses. · Recommendations/Intent for next treatment session: \"Next visit will focus on advancements to more challenging activities and reduction in assistance provided\".   Total Treatment Duration:  PT Patient Time In/Time Out  Time In: 1300  Time Out: [place], PT

## 2017-06-13 PROBLEM — K21.9 GASTROESOPHAGEAL REFLUX DISEASE WITHOUT ESOPHAGITIS: Status: ACTIVE | Noted: 2017-06-13

## 2017-07-20 ENCOUNTER — HOSPITAL ENCOUNTER (OUTPATIENT)
Dept: MRI IMAGING | Age: 42
Discharge: HOME | End: 2017-07-20
Payer: COMMERCIAL

## 2017-07-20 DIAGNOSIS — H47.10 PAPILLEDEMA OF BOTH EYES: ICD-10-CM

## 2017-07-20 LAB — CREAT BLD-MCNC: 1 MG/DL (ref 0.6–1)

## 2017-07-20 PROCEDURE — 70543 MRI ORBT/FAC/NCK W/O &W/DYE: CPT

## 2017-07-20 PROCEDURE — A9577 INJ MULTIHANCE: HCPCS

## 2017-07-20 PROCEDURE — 82565 ASSAY OF CREATININE: CPT

## 2017-07-20 PROCEDURE — 74011250636 HC RX REV CODE- 250/636

## 2017-07-20 RX ORDER — SODIUM CHLORIDE 0.9 % (FLUSH) 0.9 %
10 SYRINGE (ML) INJECTION
Status: COMPLETED | OUTPATIENT
Start: 2017-07-20 | End: 2017-07-20

## 2017-07-20 RX ADMIN — GADOBENATE DIMEGLUMINE 20 ML: 529 INJECTION, SOLUTION INTRAVENOUS at 19:23

## 2017-07-20 RX ADMIN — Medication 10 ML: at 19:24

## 2017-07-27 NOTE — PROGRESS NOTES
Interventional Radiology Pre Assessment Phone Call    Pt scheduled for diagnostic lumbar puntcture in IR on 7/28 at 1200. Pt to arrive at 1115 and check in at [facility]ett Check in at the 2nd floor radiology desk. Plan for Sedation:none    Orders: media tab Labs: n/a   H&P: 7/24 c.c. Last IR Visit: n/a  NPO: n/a     Instructed patient to continue previous medications as prescribed prior to procedure except for herbal supplements and vitamins. Pt takes occasional headache powder with ASA. Pt to take the following medications the day of the procedure with a small sip of water : all     Instructed patient to hold  the following medications the day of the procedure: no headache powder until after procedure  Patient instructed on the following and verbalized understanding:  Responsible adult must drive patient to and from hospital.  Patient verbalized understanding of all instructions and provided all medical/health information to the best of their ability.

## 2017-07-28 ENCOUNTER — HOSPITAL ENCOUNTER (OUTPATIENT)
Dept: INTERVENTIONAL RADIOLOGY/VASCULAR | Age: 42
Discharge: HOME | End: 2017-07-28
Attending: OPHTHALMOLOGY
Payer: COMMERCIAL

## 2017-07-28 VITALS
HEIGHT: 70 IN | TEMPERATURE: 98.1 F | SYSTOLIC BLOOD PRESSURE: 135 MMHG | WEIGHT: 280 LBS | RESPIRATION RATE: 16 BRPM | OXYGEN SATURATION: 98 % | BODY MASS INDEX: 40.09 KG/M2 | DIASTOLIC BLOOD PRESSURE: 75 MMHG | HEART RATE: 88 BPM

## 2017-07-28 DIAGNOSIS — H47.10 PAPILLEDEMA: ICD-10-CM

## 2017-07-28 LAB
APPEARANCE FLD: NORMAL
APPEARANCE FLD: NORMAL
COLOR FLD: NORMAL
COLOR FLD: NORMAL
GLUCOSE CSF-MCNC: 54 MG/DL (ref 40–70)
NUC CELL # FLD: 0 /CU MM
NUC CELL # FLD: 0 /CU MM
PROT CSF-MCNC: 62 MG/DL (ref 15–45)
RBC # FLD: 2625 /CU MM
RBC # FLD: NORMAL /CU MM
SPECIMEN SOURCE FLD: NORMAL
SPECIMEN SOURCE FLD: NORMAL
TUBE # CSF: 1
TUBE # CSF: 1

## 2017-07-28 PROCEDURE — 89050 BODY FLUID CELL COUNT: CPT | Performed by: OPHTHALMOLOGY

## 2017-07-28 PROCEDURE — 77030014143 HC TY PUNC LUMBR BD -A

## 2017-07-28 PROCEDURE — 77030003669 HC NDL SPN COOK -B

## 2017-07-28 PROCEDURE — 84157 ASSAY OF PROTEIN OTHER: CPT | Performed by: OPHTHALMOLOGY

## 2017-07-28 PROCEDURE — 74011000250 HC RX REV CODE- 250: Performed by: RADIOLOGY

## 2017-07-28 PROCEDURE — 62270 DX LMBR SPI PNXR: CPT

## 2017-07-28 PROCEDURE — 82945 GLUCOSE OTHER FLUID: CPT | Performed by: OPHTHALMOLOGY

## 2017-07-28 RX ORDER — LIDOCAINE HYDROCHLORIDE 20 MG/ML
2-20 INJECTION, SOLUTION INFILTRATION; PERINEURAL
Status: DISCONTINUED | OUTPATIENT
Start: 2017-07-28 | End: 2017-08-01 | Stop reason: HOSPADM

## 2017-07-28 RX ORDER — HYDROCODONE BITARTRATE AND ACETAMINOPHEN 5; 325 MG/1; MG/1
1 TABLET ORAL
Status: DISCONTINUED | OUTPATIENT
Start: 2017-07-28 | End: 2017-08-01 | Stop reason: HOSPADM

## 2017-07-28 RX ADMIN — LIDOCAINE HYDROCHLORIDE 250 MG: 20 INJECTION, SOLUTION INFILTRATION; PERINEURAL at 12:30

## 2017-07-28 RX ADMIN — SODIUM BICARBONATE 2 ML: 0.2 INJECTION, SOLUTION INTRAVENOUS at 12:30

## 2017-07-28 NOTE — PROGRESS NOTES
TRANSFER - IN REPORT:    Verbal report received from Dominick RN (name) on Shabbir Shallow  being received from IR (unit) for routine progression of care      Report consisted of patients Situation, Background, Assessment and   Recommendations(SBAR). Information from the following report(s) Procedure Summary and MAR was reviewed with the receiving nurse. Opportunity for questions and clarification was provided. Assessment completed upon patients arrival to unit and care assumed.

## 2017-07-28 NOTE — PROGRESS NOTES
Opening pressure per Dr Ariella Parisi = 16 cm/H2O. 5 ml CSF collected and sent to the lab for ordered labs

## 2017-07-28 NOTE — PROGRESS NOTES
Discharge complete. Discharge instructions reviewed with pt. Time for questions allowed. Pt denies any questions at this time. Dressing to back noted to be clean, dry, and intact. Pt assisted to front of hospital via wheelchair.      Visit Vitals    /75 (BP 1 Location: Left arm, BP Patient Position: At rest)    Pulse 88    Temp 98.1 °F (36.7 °C)    Resp 16    Ht 5' 10\" (1.778 m)    Wt 127 kg (280 lb)    SpO2 98%    BMI 40.18 kg/m2

## 2017-07-28 NOTE — IP AVS SNAPSHOT
[facility] [address] [facility]  [phone]     Patient: Graciela Lazo  MRN: [id number]  :1975           You are allergic to the following     No active allergies      Recent Documentation     Height Weight BMI Smoking Status          1.778 m 127 kg 40.18 kg/m2 Never Smoker                      Emergency Contacts       Name Discharge Info Relation Home Work Aron 79  Spouse [3] [phone]        About your hospitalization     You were admitted on:  2017 You last received care in the:  [facility] Radiology Specials    You were discharged on:  2017 Unit phone number:  [phone]      Why you were hospitalized     Your primary diagnosis was:  Not on File                 Providers Seen During Your Hospitalizations     Provider Role Specialty Primary office phone    Matilda Johns MD Attending Provider Ophthalmology [phone]      Your Primary Care Physician (PCP)     Primary Care Physician Office Phone Office Fax    Eliazar Curran [phone]      Follow-up Information     None      Your Appointments     2017  2:45 PM EDT   SHORT with Gaye Albright MD   [facility]    [address] [place] [address] [address] [phone]                            Current Discharge Medication List      ASK your doctor about these medications        Dose & Instructions Dispensing Information Comments    Morning Noon Evening Bedtime    amLODIPine 10 mg tablet   Commonly known as:  Kedar Lindsay       Your last dose was: Your next dose is:              Dose:  10 mg   Take 1 Tab by mouth daily. Indications: hypertension    Quantity:  90 Tab   Refills:  1                         omeprazole 40 mg capsule   Commonly known as:  PRILOSEC       Your last dose was: Your next dose is:              Dose:  40 mg   Take 1 Cap by mouth daily.  Indications: gastroesophageal reflux disease    Quantity:  90 Cap   Refills:  3                         silver sulfADIAZINE 1 % topical cream   Commonly known as:  SILVADENE       Your last dose was: Your next dose is:              Apply  to affected area two (2) times a day. Quantity:  50 g   Refills:  0                         valsartan 80 mg tablet   Commonly known as:  DIOVAN       Your last dose was: Your next dose is:              Dose:  80 mg   Take 1 Tab by mouth daily. Quantity:  90 Tab   Refills:  1                                   Discharge Instructions         [address] [place] [address]  Department of Interventional Radiology  [phone] Office  [phone] Fax  POST LUMBAR PUNCTURE/MYELOGRAM/INTRATHECAL CHEMOTHERAPY DISCHARGE INSTRUCTIONS  General Information:  Lumbar Puncture: A LP is done to help diagnose several disorders, like pseudo tumor, migraines, meningitis, and multiple sclerosis. It involves a puncture (usually in the lower spine) into the sac that protects the spinal column. A sample of the fluid in that space is removed and tested in the lab. Myelogram:     A Myelogram involves a lumbar puncture, and instead of removing fluid, contrast will be injected into the sac surrounding the spinal column. It is done to visualize the spinal column, nerve roots, spinal canal, vertebral discs and disc space. It is usually done to diagnose back pain with unknown cause or in preparation for surgery. After the injection, a CT scan will be done, usually within two hours of the injection. Intrathecal Chemotherapy:      Chemotherapy can be given in many forms. Intrathecal chemo involves a lumbar puncture, and instead of removing fluid, the chemo will be injected into the space. After any of procedures, you will be asked to lie flat on your back for 4-6 hours to prevent complications. You should also rest for 24 hours after you go home, and force fluids.  If you have a headache, you should take Tylenol or acetaminophen. Call If:     You should call your Physician and/or the Radiology Nurse if you develop a headache that is not relieved by Tylenol, and worsens when you stand and eases when you lie down, you need to call. You may have developed what is referred to as a spinal headache. Our physician's will probably advise you to be on strict bed rest for 24 hours, to drink lots of fluids and caffeine. If this does not help the head pain, call again the next day. You should call if you have bleeding other than a small spot on your bandage. You should call if you have any numbness, tingling, weakness, fever, chills, urinary retention, severe itching, rash, welts, swelling, or confusion. Follow-Up Instructions: See the doctor who ordered your procedure as he/she has instructed. If you had a Lumbar Puncture or Myelogram, your results should be available to your ordering doctor in 3-5 business days. You can remove your dressing in 24 hours and shower regularly. Do not bathe or swim for 72 hours. To Reach Us: If you have any questions about your procedure, please call the Interventional Radiology department at [phone]. After business hours (5pm) and weekends, call the answering service at [phone] and ask for the Radiologist on call to be paged. Interventional Radiology General Nurse Discharge    After general anesthesia or intravenous sedation, for 24 hours or while taking prescription Narcotics:  · Limit your activities  · Do not drive and operate hazardous machinery  · Do not make important personal or business decisions  · Do  not drink alcoholic beverages  · If you have not urinated within 8 hours after discharge, please contact your surgeon on call. * Please give a list of your current medications to your Primary Care Provider.   * Please update this list whenever your medications are discontinued, doses are     changed, or new medications (including over-the-counter products) are added. * Please carry medication information at all times in case of emergency situations. These are general instructions for a healthy lifestyle:    No smoking/ No tobacco products/ Avoid exposure to second hand smoke  Surgeon General's Warning:  Quitting smoking now greatly reduces serious risk to your health. Obesity, smoking, and sedentary lifestyle greatly increases your risk for illness  A healthy diet, regular physical exercise & weight monitoring are important for maintaining a healthy lifestyle    You may be retaining fluid if you have a history of heart failure or if you experience any of the following symptoms:  Weight gain of 3 pounds or more overnight or 5 pounds in a week, increased swelling in our hands or feet or shortness of breath while lying flat in bed. Please call your doctor as soon as you notice any of these symptoms; do not wait until your next office visit. Recognize signs and symptoms of STROKE:  F-face looks uneven    A-arms unable to move or move unevenly    S-speech slurred or non-existent    T-time-call 911 as soon as signs and symptoms begin-DO NOT go       Back to bed or wait to see if you get better-TIME IS BRAIN. Patient Signature:  Date: 7/28/2017  Discharging Nurse: Kelby Frazier RN            Discharge Orders     None      Introducing [facility]! [facility] introduces "[employer] patient portal. Now you can access parts of your medical record, email your doctor's office, and request medication refills online. 1. In your internet browser, go to [email or web address]   2. Click on the First Time User? Click Here link in the Sign In box. You will see the New Member Sign Up page. 3. Enter your "[employer] Access Code exactly as it appears below. You will not need to use this code after youve completed the sign-up process.  If you do not sign up before the expiration date, you must request a new code.    · [employer] Access Code: [email or web address]  Expires: 8/20/2017 11:23 AM    4. Enter the last four digits of your Social Security Number (xxxx) and Date of Birth (mm/dd/yyyy) as indicated and click Submit. You will be taken to the next sign-up page. 5. Create a [employer]CD ID. This will be your JCD login ID and cannot be changed, so think of one that is secure and easy to remember. 6. Create a [employer] password. You can change your password at any time. 7. Enter your Password Reset Question and Answer. This can be used at a later time if you forget your password. 8. Enter your e-mail address. You will receive e-mail notification when new information is available in [address] 9. Click Sign Up. You can now view and download portions of your medical record. 10. Click the Download Summary menu link to download a portable copy of your medical information. If you have questions, please visit the Frequently Asked Questions section of the [employer] website. Remember, [employer] is NOT to be used for urgent needs. For medical emergencies, dial 911. Now available from your iPhone and Android! General Information            Please provide this summary of care documentation to your next provider.                    Patient Signature:  ____________________________________________________________    Date:  ____________________________________________________________      `           Provider Signature:  ____________________________________________________________    Date:  ____________________________________________________________

## 2017-07-28 NOTE — PROCEDURES
Department of Interventional Radiology  [phone]        Interventional Radiology Brief Procedure Note    Patient: Evelia Valentin MRN: [id number]  SSN: [id number]    YOB: 1975  Age: 38 y.o. Sex: male      Date of Procedure: 7/28/2017    Pre-Procedure Diagnosis: Papilledema    Post-Procedure Diagnosis: SAME    Procedure(s): Lumbar Puncture    Performed By: Lalo Alexander MD     Assistants: None    Anesthesia:Lidocaine    Estimated Blood Loss: None    Specimens: CSF    Implants: None    Findings:Opening pressure.    Complications: None    Follow Up: prn    Signed By: Lalo Alexander MD     July 28, 2017

## 2017-07-28 NOTE — DISCHARGE INSTRUCTIONS
[address]  Department of Interventional Radiology  [phone] Office  [phone] Fax  POST LUMBAR PUNCTURE/MYELOGRAM/INTRATHECAL CHEMOTHERAPY DISCHARGE INSTRUCTIONS  General Information:  Lumbar Puncture: A LP is done to help diagnose several disorders, like pseudo tumor, migraines, meningitis, and multiple sclerosis. It involves a puncture (usually in the lower spine) into the sac that protects the spinal column. A sample of the fluid in that space is removed and tested in the lab. Myelogram:     A Myelogram involves a lumbar puncture, and instead of removing fluid, contrast will be injected into the sac surrounding the spinal column. It is done to visualize the spinal column, nerve roots, spinal canal, vertebral discs and disc space. It is usually done to diagnose back pain with unknown cause or in preparation for surgery. After the injection, a CT scan will be done, usually within two hours of the injection. Intrathecal Chemotherapy:      Chemotherapy can be given in many forms. Intrathecal chemo involves a lumbar puncture, and instead of removing fluid, the chemo will be injected into the space. After any of procedures, you will be asked to lie flat on your back for 4-6 hours to prevent complications. You should also rest for 24 hours after you go home, and force fluids. If you have a headache, you should take Tylenol or acetaminophen. Call If:     You should call your Physician and/or the Radiology Nurse if you develop a headache that is not relieved by Tylenol, and worsens when you stand and eases when you lie down, you need to call. You may have developed what is referred to as a spinal headache. Our physician's will probably advise you to be on strict bed rest for 24 hours, to drink lots of fluids and caffeine. If this does not help the head pain, call again the next day.  You should call if you have bleeding other than a small spot on your bandage. You should call if you have any numbness, tingling, weakness, fever, chills, urinary retention, severe itching, rash, welts, swelling, or confusion. Follow-Up Instructions: See the doctor who ordered your procedure as he/she has instructed. If you had a Lumbar Puncture or Myelogram, your results should be available to your ordering doctor in 3-5 business days. You can remove your dressing in 24 hours and shower regularly. Do not bathe or swim for 72 hours. To Reach Us: If you have any questions about your procedure, please call the Interventional Radiology department at [phone]. After business hours (5pm) and weekends, call the answering service at [phone] and ask for the Radiologist on call to be paged. Interventional Radiology General Nurse Discharge    After general anesthesia or intravenous sedation, for 24 hours or while taking prescription Narcotics:  · Limit your activities  · Do not drive and operate hazardous machinery  · Do not make important personal or business decisions  · Do  not drink alcoholic beverages  · If you have not urinated within 8 hours after discharge, please contact your surgeon on call. * Please give a list of your current medications to your Primary Care Provider. * Please update this list whenever your medications are discontinued, doses are     changed, or new medications (including over-the-counter products) are added. * Please carry medication information at all times in case of emergency situations. These are general instructions for a healthy lifestyle:    No smoking/ No tobacco products/ Avoid exposure to second hand smoke  Surgeon General's Warning:  Quitting smoking now greatly reduces serious risk to your health.     Obesity, smoking, and sedentary lifestyle greatly increases your risk for illness  A healthy diet, regular physical exercise & weight monitoring are important for maintaining a healthy lifestyle    You may be retaining fluid if you have a history of heart failure or if you experience any of the following symptoms:  Weight gain of 3 pounds or more overnight or 5 pounds in a week, increased swelling in our hands or feet or shortness of breath while lying flat in bed. Please call your doctor as soon as you notice any of these symptoms; do not wait until your next office visit. Recognize signs and symptoms of STROKE:  F-face looks uneven    A-arms unable to move or move unevenly    S-speech slurred or non-existent    T-time-call 911 as soon as signs and symptoms begin-DO NOT go       Back to bed or wait to see if you get better-TIME IS BRAIN.       Patient Signature:  Date: 7/28/2017  Discharging Nurse: Ac Key RN

## 2017-09-21 NOTE — PROGRESS NOTES
Alexi Ruiz  : 1975 [facility] [address]  [phone]   [phone]       OUTPATIENT PHYSICAL THERAPY: discharge note 2017    ICD-10: Treatment Diagnosis: Cervicalgia (M54.2)   Precautions/Allergies:   Review of patient's allergies indicates no known allergies. Fall Risk Score: 1 (? 5 = High Risk)  MD Orders: Eval and Treat MEDICAL/REFERRING DIAGNOSIS:  Neck pain [M54.2]   DATE OF ONSET: six months  REFERRING PHYSICIAN: Chasity Dong MD  RETURN PHYSICIAN APPOINTMENT: TBD by patient      INITIAL ASSESSMENT:  Mr. Alexi Ruiz has attended 7 physical therapy session including initial evaluation. Alexi Ruiz presents with S/S of increased pain, decreased ROM, decreased strength in the right C6 myotomes, decreased functional tolerance consistent with S/S of cervical radicular symtomology. He reported some improved symptoms with treatment, and was provided with an advanced HEP for improved posture and strengthening. PROBLEM LIST (Impacting functional limitations):  1. Decreased Strength  2. Decreased ADL/Functional Activities  3. Increased Pain  4. Decreased Activity Tolerance  5. Decreased Work Simplification/Energy Conservation Techniques  6. Decreased Flexibility/Joint Mobility  7. Decreased [place] with Home Exercise Program INTERVENTIONS PLANNED:  1. Balance Exercise  2. Bed Mobility  3. Cold  4. Cryotherapy  5. Heat  6. Home Exercise Program (HEP)  7. Manual Therapy  8. Neuromuscular Re-education/Strengthening  9. Range of Motion (ROM)  10. Therapeutic Exercise/Strengthening  11. Transfer Training   TREATMENT PLAN:  Effective Dates: 2017 TO 2017. Frequency/Duration: 2 times a week for 6 weeks   GOALS: (Goals have been discussed and agreed upon with patient.)  SHORT-TERM FUNCTIONAL GOALS: Time Frame: 3 weeks  1.   Alexi Ruiz will report <=4/10 pain to cervical spine with performance of functional spinal mobility and rotation. Met  2. Donald Nunez will demonstrate improved NDI score, indicating spinal improvement from 15/50 to 8/50 affecting minimal to no difficulty with performance of cervical mobility and strengthening. Not Met  3. Donald uNnez to be independent with initial HEP for cervical region and UE's. Met  4. Donald Mayra will improve ROM to >=90% to assist with improved function during instrumental activities of daily living. Progressed  5. Donald Nunez will improve MMT to >=4+/5 to all UE strengthening to return to PLOF and improve functional tolerance. Progressed    DISCHARGE GOALS: Time Frame: 6 weeks  1. Donald Nunez will report <=2/10 pain to cervical spine with performance of functional spinal mobility and rotation and minimal to no difficulty with such tasks. Progressed  2. Donald Nunez will demonstrate improved NDI score, indicating spinal improvement from 8/50 to 4/50 affecting minimal to no difficulty with performance of cervical mobility and strengthening. Progressed   3. Donald Nunez to be independent with advanced HEP for cervical region and UE's. Met              HISTORY:   History of Present Injury/Illness (Reason for Referral):  Mr. Earline Miner reports onset of right sided neck pain and arm numbness that began in the summer and has persisted. He reports that his symptoms are exacerbated by swinging a golf club, sitting with poor posture, turning looking to the right when driving, sleep when laying on his right side, and reading. He reports that his symptoms make driving during work duties difficult. His goals are to improved his pain, ROM, and be safe with sleep, home, and work duties. Past Medical History/Comorbidities:   Mr. Earline Miner  has a past medical history of GERD (gastroesophageal reflux disease) and Hypertension.   Mr. Earline Miner  has a past surgical history that includes hernia repair. Social History/Living Environment:     lives with family in private residence  Prior Level of Function/Work/Activity:  Works for a "[employer]" services  Previous Treatment Approaches:          Previously has had similar symptoms that resolved on their own a few years ago. Current Medications:    Current Outpatient Prescriptions:     gabapentin (NEURONTIN) 300 mg capsule, , Disp: , Rfl:     acetaZOLAMIDE (DIAMOX) 250 mg tablet, TAKE 1 TABLET FOUR TIMES A DAY, Disp: , Rfl: 4    amLODIPine (NORVASC) 10 mg tablet, Take 1 Tab by mouth daily. Indications: hypertension, Disp: 90 Tab, Rfl: 1    valsartan (DIOVAN) 80 mg tablet, Take 1 Tab by mouth daily. , Disp: 90 Tab, Rfl: 1    silver sulfADIAZINE (SILVADENE) 1 % topical cream, Apply  to affected area two (2) times a day., Disp: 50 g, Rfl: 0    omeprazole (PRILOSEC) 40 mg capsule, Take 1 Cap by mouth daily. Indications: gastroesophageal reflux disease, Disp: 90 Cap, Rfl: 3     Date Last Reviewed:  6/2/2017     Number of Personal Factors/Comorbidities that affect the Plan of Care: 1-2: MODERATE COMPLEXITY   EXAMINATION:   Observation/Orthostatic Postural Assessment: Forward head posture with upper crossed syndrome present. Palpation:          Tenderness over the right cervical superficial extensors and upper trap. Tenderness to palpation at righ CT junction and upper thoracic segments with P/A glides  ROM:    Joint: Eval Date: 5/1/2017 Re-Assess Date: Re-Assess Date:         Cervical AROM      Flexion (% of normal): 100%     Extension (% of normal): 50%     L sidebending (% of normal): 75%     R sidebending (% of normal): 50% with pain     L rotation (% of normal): 75%      R rotation (% of normal): 50% with pain                 Pain at end-range or with AROM? Yes/No Yes     Any pain with overpressure?  Yes/No Yes       Strength:    Joint: Eval Date:  Re-Assess Date:  Re-Assess Date:     RIGHT LEFT RIGHT LEFT RIGHT LEFT   Shoulder flexion:  5/5 5/5       Shoulder extension: 5/5 5/5       Shoulder abduction: 5/5 5/5       Shoulder IR: 5/5 5/5       Shoulder ER: 4+/5 5/5       Elbow flexion: 4+/5 5/5       Elbow extension: 5/5 5/5       Wrist flexion/extension: 4+/5 5/5           Special Tests: Assessed @ Initial Visit    Spurling's Test: Positive on right   Positive cranial flexion test with inability to stabilize at 22  Neurological Screen: Radiating symptoms? Yes     Functional Mobility:  Assessed @ Initial Visit   Balance:  Assessed @ Initial Visit      Body Structures Involved:  1. Nerves  2. Joints  3. Muscles  4. Ligaments Body Functions Affected:  1. Sensory/Pain  2. Neuromusculoskeletal  3. Movement Related Activities and Participation Affected:  1. General Tasks and Demands  2. Mobility  3. Self Care  4. Interpersonal Interactions and Relationships   Number of elements that affect the Plan of Care: 3: MODERATE COMPLEXITY   CLINICAL PRESENTATION:   Presentation: Stable and uncomplicated: LOW COMPLEXITY   CLINICAL DECISION MAKING:   Outcome Measure: Tool Used: Neck Disability Index (NDI)  Score:  Initial: 15/50  Most Recent: X/50 (Date: -- )   Interpretation of Score: The Neck Disability Index is a revised form of the Oswestry Low Back Pain Index and is designed to measure the activities of daily living in person's with neck pain. Each section is scored on a 0-5 scale, 5 representing the greatest disability. The scores of each section are added together for a total score of 50. Score 0 1-10 11-20 21-30 31-40 41-49 50   Modifier CH CI CJ CK CL CM CN       Medical Necessity:   · Skilled intervention continues to be required due to address above deficits affecting participation in basic ADLs and overall functional tolerance. Reason for Services/Other Comments:  · Patient continues to require skilled intervention due to address above deficits affecting participation in basic ADLs and overall functional tolerance.    Use of outcome tool(s) and clinical judgement create a POC that gives a: Clear prediction of patient's progress: LOW COMPLEXITY                 Thank You for this referral!    Colton De La Rosa, PT
